# Patient Record
Sex: MALE | Race: WHITE | URBAN - METROPOLITAN AREA
[De-identification: names, ages, dates, MRNs, and addresses within clinical notes are randomized per-mention and may not be internally consistent; named-entity substitution may affect disease eponyms.]

---

## 2017-04-04 ENCOUNTER — OUTPATIENT (OUTPATIENT)
Dept: OUTPATIENT SERVICES | Facility: HOSPITAL | Age: 62
LOS: 1 days | Discharge: HOME | End: 2017-04-04

## 2017-06-27 DIAGNOSIS — J44.9 CHRONIC OBSTRUCTIVE PULMONARY DISEASE, UNSPECIFIED: ICD-10-CM

## 2017-06-27 DIAGNOSIS — I25.10 ATHEROSCLEROTIC HEART DISEASE OF NATIVE CORONARY ARTERY WITHOUT ANGINA PECTORIS: ICD-10-CM

## 2018-01-06 ENCOUNTER — OUTPATIENT (OUTPATIENT)
Dept: OUTPATIENT SERVICES | Facility: HOSPITAL | Age: 63
LOS: 1 days | Discharge: HOME | End: 2018-01-06

## 2018-01-06 DIAGNOSIS — I20.9 ANGINA PECTORIS, UNSPECIFIED: ICD-10-CM

## 2018-01-06 DIAGNOSIS — I71.4 ABDOMINAL AORTIC ANEURYSM, WITHOUT RUPTURE: ICD-10-CM

## 2018-01-06 DIAGNOSIS — I10 ESSENTIAL (PRIMARY) HYPERTENSION: ICD-10-CM

## 2018-01-06 DIAGNOSIS — J44.9 CHRONIC OBSTRUCTIVE PULMONARY DISEASE, UNSPECIFIED: ICD-10-CM

## 2019-01-15 ENCOUNTER — CHART COPY (OUTPATIENT)
Age: 64
End: 2019-01-15

## 2019-01-15 PROBLEM — Z00.00 ENCOUNTER FOR PREVENTIVE HEALTH EXAMINATION: Status: ACTIVE | Noted: 2019-01-15

## 2019-01-18 ENCOUNTER — FORM ENCOUNTER (OUTPATIENT)
Age: 64
End: 2019-01-18

## 2019-01-19 ENCOUNTER — OUTPATIENT (OUTPATIENT)
Dept: OUTPATIENT SERVICES | Facility: HOSPITAL | Age: 64
LOS: 1 days | Discharge: HOME | End: 2019-01-19

## 2019-01-19 DIAGNOSIS — I71.2 THORACIC AORTIC ANEURYSM, WITHOUT RUPTURE: ICD-10-CM

## 2019-02-05 ENCOUNTER — APPOINTMENT (OUTPATIENT)
Dept: CARDIOTHORACIC SURGERY | Facility: CLINIC | Age: 64
End: 2019-02-05

## 2019-02-05 VITALS
WEIGHT: 270 LBS | HEIGHT: 74 IN | TEMPERATURE: 98.4 F | RESPIRATION RATE: 13 BRPM | SYSTOLIC BLOOD PRESSURE: 137 MMHG | HEART RATE: 68 BPM | DIASTOLIC BLOOD PRESSURE: 85 MMHG | OXYGEN SATURATION: 96 % | BODY MASS INDEX: 34.65 KG/M2

## 2019-02-05 DIAGNOSIS — Z87.891 PERSONAL HISTORY OF NICOTINE DEPENDENCE: ICD-10-CM

## 2019-02-05 DIAGNOSIS — Z78.9 OTHER SPECIFIED HEALTH STATUS: ICD-10-CM

## 2019-02-05 DIAGNOSIS — Z82.49 FAMILY HISTORY OF ISCHEMIC HEART DISEASE AND OTHER DISEASES OF THE CIRCULATORY SYSTEM: ICD-10-CM

## 2019-02-05 NOTE — ASSESSMENT
[FreeTextEntry1] : Mr. PETTIT 63 year M  arrives today for his annual check for his ascending aortic aneurysm. He states that he is doing well, denies pain, or SOB. Denies CP or palpitations. States that he exercises by walking, and otherwise does not do any exertional exercising. He offers no complaints at this time. Recent CT results were reviewed with patient, and pt will f/u with our office in 1 yr with a repeat CT scan.

## 2019-02-05 NOTE — HISTORY OF PRESENT ILLNESS
[FreeTextEntry1] : Mr. PETTIT 62 y/o M  arrives today for his annual check for his ascending aortic aneurysm.\par \par His current healthcare team is as follows:\par Cardio: Magdi\par PMD: Min\par Pulm: Eldon

## 2019-02-05 NOTE — PHYSICAL EXAM
[] : no respiratory distress [Auscultation Breath Sounds / Voice Sounds] : lungs were clear to auscultation bilaterally [Heart Rate And Rhythm] : heart rate was normal and rhythm regular [Heart Sounds] : normal S1 and S2 [Heart Sounds Gallop] : no gallops [Murmurs] : no murmurs [Heart Sounds Pericardial Friction Rub] : no pericardial rub [Abnormal Walk] : normal gait [Nail Clubbing] : no clubbing  or cyanosis of the fingernails [Musculoskeletal - Swelling] : no joint swelling seen [Motor Tone] : muscle strength and tone were normal [Oriented To Time, Place, And Person] : oriented to person, place, and time [Impaired Insight] : insight and judgment were intact [Affect] : the affect was normal

## 2019-02-05 NOTE — DATA REVIEWED
[FreeTextEntry1] : EXAM: CT CHEST \par PROCEDURE DATE: 01/19/2019 \par INTERPRETATION: Reason for Exam: Enlarged aorta, CABG, ex-smoker. \par CT of the chest was performed from the thoracic inlet to the level of the \par adrenal glands without contrast injection. Coronal and sagittal images have \par been submitted. \par \par Comparison: 1/6/2018. \par \par Findings: \par \par Tubes/Lines: None. \par \par Lungs, Pleura, and Airways:No consolidation, effusion or pneumothorax. No \par suspicious nodules. Trachea and endobronchial tree within normal limits. \par Stable 1 cm left upper lobe air cyst (series 4, image 2-3). \par \par Mediastinum/Lymph Nodes:No enlarged mediastinal lymph nodes. Stable \par calcified left thyroid nodule. \par \par Heart/Great Vessels: The heart size is within normal limits without \par pericardial effusion. Patient is post CABG. The ascending thoracic aorta \par measures 4.6 x 4.5 cm at the level of the main pulmonary artery (series 4 \par image 109), stable. Aortic arch, normal 3.5 cm, stable. Descending thoracic \par aorta is stable at 3.3 cm \par \par Upper abdomen: Hepatic steatosis. \par \par Bones and soft tissues: Old left anterior rib fractures. Post-sternotomy. \par \par IMPRESSION: \par \par Stable aortic aneurysm, the ascending aorta measuring 4.6 x 4.5 cm.

## 2019-11-12 ENCOUNTER — APPOINTMENT (OUTPATIENT)
Dept: CARDIOLOGY | Facility: CLINIC | Age: 64
End: 2019-11-12
Payer: COMMERCIAL

## 2019-11-12 PROCEDURE — 93880 EXTRACRANIAL BILAT STUDY: CPT

## 2019-11-18 ENCOUNTER — APPOINTMENT (OUTPATIENT)
Dept: CARDIOLOGY | Facility: CLINIC | Age: 64
End: 2019-11-18
Payer: COMMERCIAL

## 2019-11-18 PROCEDURE — 93306 TTE W/DOPPLER COMPLETE: CPT

## 2019-11-26 ENCOUNTER — APPOINTMENT (OUTPATIENT)
Dept: CARDIOLOGY | Facility: CLINIC | Age: 64
End: 2019-11-26
Payer: COMMERCIAL

## 2019-11-26 PROCEDURE — 93000 ELECTROCARDIOGRAM COMPLETE: CPT

## 2019-11-26 PROCEDURE — 99214 OFFICE O/P EST MOD 30 MIN: CPT

## 2020-01-18 ENCOUNTER — OUTPATIENT (OUTPATIENT)
Dept: OUTPATIENT SERVICES | Facility: HOSPITAL | Age: 65
LOS: 1 days | Discharge: HOME | End: 2020-01-18
Payer: COMMERCIAL

## 2020-01-18 DIAGNOSIS — R17 UNSPECIFIED JAUNDICE: ICD-10-CM

## 2020-01-18 DIAGNOSIS — R63.4 ABNORMAL WEIGHT LOSS: ICD-10-CM

## 2020-01-18 DIAGNOSIS — R05 COUGH: ICD-10-CM

## 2020-01-18 PROCEDURE — 74177 CT ABD & PELVIS W/CONTRAST: CPT | Mod: 26

## 2020-01-18 PROCEDURE — 71260 CT THORAX DX C+: CPT | Mod: 26

## 2020-02-25 ENCOUNTER — APPOINTMENT (OUTPATIENT)
Dept: CARDIOTHORACIC SURGERY | Facility: CLINIC | Age: 65
End: 2020-02-25
Payer: COMMERCIAL

## 2020-02-25 VITALS
DIASTOLIC BLOOD PRESSURE: 94 MMHG | WEIGHT: 245 LBS | BODY MASS INDEX: 31.44 KG/M2 | TEMPERATURE: 98.3 F | HEIGHT: 74 IN | SYSTOLIC BLOOD PRESSURE: 139 MMHG | OXYGEN SATURATION: 94 % | RESPIRATION RATE: 12 BRPM | HEART RATE: 75 BPM

## 2020-02-25 PROCEDURE — 99212 OFFICE O/P EST SF 10 MIN: CPT

## 2020-02-25 RX ORDER — METFORMIN HYDROCHLORIDE 1000 MG/1
1000 TABLET, COATED ORAL
Refills: 0 | Status: DISCONTINUED | COMMUNITY
End: 2020-02-25

## 2020-02-25 RX ORDER — EZETIMIBE AND SIMVASTATIN 10; 10 MG/1; MG/1
10-10 TABLET ORAL
Refills: 0 | Status: DISCONTINUED | COMMUNITY
End: 2020-02-25

## 2020-02-25 NOTE — PHYSICAL EXAM
[Sclera] : the sclera and conjunctiva were normal [] : no respiratory distress [Respiration, Rhythm And Depth] : normal respiratory rhythm and effort [Exaggerated Use Of Accessory Muscles For Inspiration] : no accessory muscle use [Auscultation Breath Sounds / Voice Sounds] : lungs were clear to auscultation bilaterally [Heart Sounds] : normal S1 and S2 [Heart Rate And Rhythm] : heart rate was normal and rhythm regular [Abdomen Soft] : soft [Murmurs] : no murmurs [Skin Color & Pigmentation] : normal skin color and pigmentation [Oriented To Time, Place, And Person] : oriented to person, place, and time [Affect] : the affect was normal [Impaired Insight] : insight and judgment were intact [Mood] : the mood was normal

## 2020-03-06 NOTE — DATA REVIEWED
[FreeTextEntry1] : \par EXAM: CT ABDOMEN AND PELVIS OC IC \par EXAM: CT CHEST IC \par \par \par PROCEDURE DATE: 01/18/2020 \par \par \par \par \par INTERPRETATION: CLINICAL HISTORY/REASON FOR EXAM: Jaundice.. \par \par TECHNIQUE: Contiguous axial CT images were obtained from the thoracic inlet \par to the pubic symphysis following administration of Optiray 320 intravenous \par contrast. Oral contrast was administered. Reformatted images in the coronal \par and sagittal planes were acquired. Axial MIP images of the chest were \par obtained. \par \par This CT study was performed using radiation dose reduction software that \par reduces mA or kVp according to the patient's size to obtain diagnostic image \par quality with patient exposure as low as reasonably achievable. \par \par COMPARISON: CT chest 1/19/19. \par \par FINDINGS: \par \par CHEST: \par \par Tubes/Lines: None. \par \par Lungs, Pleura, and Airways:No consolidation, effusion or pneumothorax. No \par suspicious nodules. Trachea and endobronchial tree within normal limits. \par Stable 1 cm left upper lobe air cyst (series 4, image 2-3). \par \par Mediastinum/Lymph Nodes:No enlarged mediastinal lymph nodes. Stable \par calcified left thyroid nodule. \par \par Heart/Great Vessels: The heart size is within normal limits without \par pericardial effusion. Patient is post CABG. The ascending thoracic aorta \par measures 4.6 x 4.5 cm at the level of the main pulmonary artery, stable. \par Aortic arch, 3.5 cm, stable. Descending thoracic \par aorta is stable at 3.3 cm \par \par BONES/SOFT TISSUES: Old left anterior rib fractures. Gynecomastia. \par Poststernotomy. \par \par ABDOMEN/PELVIS: \par \par HEPATOBILIARY: Marked hepatomegaly. Hepatic steatosis. Trace perihepatic \par ascites. No focal lesions are seen. No biliary ductal dilatation is seen. \par \par SPLEEN: Mild splenomegaly. \par \par PANCREAS: 1.4 x 1.1 x 1.4 cm hypodensity within the pancreatic neck anterior \par to the SMV, indeterminate (series 4, image 278). No pancreatic duct \par dilatation. \par \par ADRENAL GLANDS: Bilateral adrenal gland thickening. \par \par KIDNEYS: Bilateral renal cysts and other indeterminate hypodensities. Right \par renal nonobstructing stones up to 6 mm. Left renal nonobstructing stones up \par to 7 mm. Symmetric pattern of renal enhancement. No hydronephrosis \par bilaterally. \par \par ABDOMINOPELVIC NODES: Prominent periportal lymph nodes. \par \par PELVIC ORGANS: Unremarkable. \par \par PERITONEUM/MESENTERY/BOWEL: No bowel obstruction. No pneumoperitoneum. \par Scattered colonic diverticula.. Normal appendix. \par \par BONES/SOFT TISSUES: Bilateral fat-containing hernias. Lower lumbar \par degenerative changes and probably degenerative listheses. \par \par OTHER: Vascular calcifications. \par \par \par IMPRESSION: \par \par Marked hepatomegaly and hepatic steatosis. No focal hepatic lesions are seen. \par \par 1.4 x 1.1 x 1.4 cm hypodensity within the pancreatic neck anterior to the \par SMV. It requires followup, however unlikely to be a cause of jaundice given \par the lack of ductal obstruction. \par \par Mild splenomegaly. \par \par Bilateral renal cysts and other indeterminate hypodensities, follow-up \par recommended. \par \par Other incidental findings as described. \par \par \par \par \par \par \par DOMINGA SANCHES M.D., ATTENDING RADIOLOGIST \par This document has been electronically signed. Jan 18 2020 5:42PM \par \par \par \par \par \par \par \par \par \par \par \par \par \par

## 2020-03-06 NOTE — ASSESSMENT
[FreeTextEntry1] : Mr. PETTIT 63 year M  arrives today for his annual check for his ascending aortic aneurysm. He states that he is doing well, denies pain, or SOB. Denies CP or palpitations. States that he exercises by walking, and otherwise does not do any exertional exercising. He offers no complaints at this time. Recent CT results were reviewed with patient, and pt will f/u with our office in 1 yr with a repeat CT scan.\par \par Patient is post CABG. The ascending thoracic aorta measures 4.6 x 4.5 cm at the level of the main pulmonary artery, stable. Aortic arch, 3.5 cm, stable. Descending thoracic aorta is stable at 3.3 cm .\par No change noted - will continue to monitor with yearly CT chest- non-contrast.  No surgical intervention indicated at this time.  All questions answered.  \par \par Maintain SBP < 160 and -60\par Life Style Modification to continue\par RTO in 1 year, or sooner if needed\par Medication Regimen as per Min Noble Bruno\par CT Chest non-con in Jan 2021\par \par

## 2020-03-06 NOTE — HISTORY OF PRESENT ILLNESS
[FreeTextEntry1] : Mr. PETTIT 64 y/o M  arrives today for his annual check for his ascending aortic aneurysm.\par \par  Patient is post CABG. The ascending thoracic aorta \par measures 4.6 x 4.5 cm at the level of the main pulmonary artery, stable. \par Aortic arch, 3.5 cm, stable. Descending thoracic \par aorta is stable at 3.3 cm \par \par \par Pt reports feeling, "good", lost weight was having some GI issues- weak and lost of appetite, resolved at this time. \par \par  \par His current healthcare team is as follows:\par Cardio: Magdi\par PMD: Fulop\par Pulm: Eldon

## 2020-03-06 NOTE — REVIEW OF SYSTEMS
[Negative] : Psychiatric [Fever] : no fever [Feeling Poorly] : not feeling poorly [Chest Pain] : no chest pain [Palpitations] : no palpitations [Shortness Of Breath] : no shortness of breath [Cough] : no cough [SOB on Exertion] : no shortness of breath during exertion [Confused] : no confusion [Dizziness] : no dizziness [Fainting] : no fainting

## 2020-08-19 ENCOUNTER — RECORD ABSTRACTING (OUTPATIENT)
Age: 65
End: 2020-08-19

## 2020-08-19 DIAGNOSIS — R55 SYNCOPE AND COLLAPSE: ICD-10-CM

## 2020-08-19 DIAGNOSIS — Z87.19 PERSONAL HISTORY OF OTHER DISEASES OF THE DIGESTIVE SYSTEM: ICD-10-CM

## 2020-08-19 DIAGNOSIS — Z86.79 PERSONAL HISTORY OF OTHER DISEASES OF THE CIRCULATORY SYSTEM: ICD-10-CM

## 2020-08-19 RX ORDER — ENALAPRIL MALEATE 2.5 MG/1
2.5 TABLET ORAL DAILY
Refills: 0 | Status: ACTIVE | COMMUNITY

## 2020-09-03 ENCOUNTER — APPOINTMENT (OUTPATIENT)
Dept: CARDIOLOGY | Facility: CLINIC | Age: 65
End: 2020-09-03
Payer: COMMERCIAL

## 2020-09-03 VITALS
HEIGHT: 74 IN | HEART RATE: 77 BPM | WEIGHT: 245 LBS | TEMPERATURE: 98.6 F | DIASTOLIC BLOOD PRESSURE: 90 MMHG | SYSTOLIC BLOOD PRESSURE: 130 MMHG | BODY MASS INDEX: 31.44 KG/M2

## 2020-09-03 PROCEDURE — 99214 OFFICE O/P EST MOD 30 MIN: CPT

## 2020-09-03 PROCEDURE — 93000 ELECTROCARDIOGRAM COMPLETE: CPT

## 2021-01-19 RX ORDER — METOPROLOL SUCCINATE 50 MG/1
50 TABLET, EXTENDED RELEASE ORAL DAILY
Qty: 90 | Refills: 3 | Status: ACTIVE | COMMUNITY
Start: 1900-01-01 | End: 1900-01-01

## 2021-01-27 ENCOUNTER — NON-APPOINTMENT (OUTPATIENT)
Age: 66
End: 2021-01-27

## 2021-02-11 ENCOUNTER — NON-APPOINTMENT (OUTPATIENT)
Age: 66
End: 2021-02-11

## 2021-02-16 ENCOUNTER — RESULT REVIEW (OUTPATIENT)
Age: 66
End: 2021-02-16

## 2021-02-16 ENCOUNTER — OUTPATIENT (OUTPATIENT)
Dept: OUTPATIENT SERVICES | Facility: HOSPITAL | Age: 66
LOS: 1 days | Discharge: HOME | End: 2021-02-16
Payer: COMMERCIAL

## 2021-02-16 DIAGNOSIS — I72.2 ANEURYSM OF RENAL ARTERY: ICD-10-CM

## 2021-02-16 PROCEDURE — 71250 CT THORAX DX C-: CPT | Mod: 26

## 2021-02-24 ENCOUNTER — APPOINTMENT (OUTPATIENT)
Dept: CARDIOTHORACIC SURGERY | Facility: CLINIC | Age: 66
End: 2021-02-24
Payer: COMMERCIAL

## 2021-02-24 ENCOUNTER — APPOINTMENT (OUTPATIENT)
Dept: CARDIOTHORACIC SURGERY | Facility: CLINIC | Age: 66
End: 2021-02-24

## 2021-02-24 PROCEDURE — 99212 OFFICE O/P EST SF 10 MIN: CPT | Mod: 95

## 2021-03-17 NOTE — HISTORY OF PRESENT ILLNESS
[FreeTextEntry1] : Mr. PETTIT 66 y/o M  arrives today for his annual check for his ascending aortic aneurysm.  Patient is post CABG.  His recent CT February 2021 showed Stable dilatation mid ascending aorta approximately 4.6 cm.  Visit today for results review.\par  \par His current healthcare team is as follows:\par Cardio: Magdi\par PMD: Min\par Pulm: Eldon

## 2021-03-17 NOTE — DATA REVIEWED
[FreeTextEntry1] :  EXAM:  CT CHEST\par \par \par PROCEDURE DATE:  02/16/2021\par \par \par \par \par INTERPRETATION:  Reason for Exam: Ascending aorta aneurysm follow-up\par CT of the chest was performed from the thoracic inlet to the level of the adrenal glands without contrast injection. Coronal and sagittal images have been submitted.\par \par Comparison: CT CHEST 1/18/2020, 1/19/2019\par \par Findings:\par \par Tubes/Lines: None\par \par Lungs, Pleura, and Airways: No endobronchial obstruction, mass or pleural effusions are noted. There is no evidence of bronchiectasis or honeycombing.\par New ill-defined area of nonspecific groundglass attenuation involving posterior right lower lobe is noted (series 601/121).\par \par Pulmonary nodules:\par No suspicious pulmonary nodules\par Punctate calcified granuloma left lower lobe (4/198)\par \par Mediastinum/Lymph Nodes:Stable calcification left thyroid lobe.\par \par Poststernotomy/CABG. No mediastinal adenopathy or mass.\par \par Heart/Great Vessels: No pericardial effusions. Stable dilatation mid ascending aorta approximately 4.6 cm (2/25). Stable tortuosity distal descending thoracic aorta Scattered coronary artery and aortic calcifications.\par \par Abdomen: Mild hepatic steatosis. Stable right renal cyst.\par \par Bones and soft tissues: No suspicious focal osseous lesions\par \par IMPRESSION:\par \par Stable dilatation mid ascending aorta approximately 4.6 cm (2/25).\par \par  Stable tortuosity distal descending thoracic aorta. Scattered coronary artery and aortic calcifications.\par \par New subtle ill-defined area of nonspecific groundglass attenuation, posterior right lower lobe is noted (series 601/121), likely representing area nonspecific airway inflammation..

## 2021-03-17 NOTE — ASSESSMENT
[FreeTextEntry1] : Mr. PETTIT 65 year M  arrives today for his annual check for his ascending aortic aneurysm. He states that he is doing well, denies pain, or SOB. Denies CP or palpitations. States that he exercises by walking, and otherwise does not do any exertional exercising.   His recent CT showed stable dilatation mid ascending aorta approximately 4.6 cm, stable tortuosity distal descending thoracic aorta. Scattered coronary artery and aortic calcifications.  Also a new subtle ill-defined area of nonspecific groundglass attenuation, posterior right lower lobe is noted (series 601/121), likely representing area nonspecific airway inflammation.  Televisit for review of CT Chest.\par \par Feels well since last seen\par No changes in medical history or medications\par \par Plan:\par CT Chest Non-Contrast 1 year\par FU after CT Chest\par F/U with Pulmonologist Dr. Colón, CT showed nonspecific GGO, will fax report\par F/U with Dr. Fairbanks for echocardiogram \par F/U with PMD for routine medical care\par \par -FMR

## 2021-04-20 ENCOUNTER — APPOINTMENT (OUTPATIENT)
Dept: CARDIOLOGY | Facility: CLINIC | Age: 66
End: 2021-04-20
Payer: COMMERCIAL

## 2021-04-20 PROCEDURE — 93306 TTE W/DOPPLER COMPLETE: CPT

## 2021-04-20 PROCEDURE — 99072 ADDL SUPL MATRL&STAF TM PHE: CPT

## 2021-05-04 ENCOUNTER — RESULT CHARGE (OUTPATIENT)
Age: 66
End: 2021-05-04

## 2021-05-04 ENCOUNTER — APPOINTMENT (OUTPATIENT)
Dept: CARDIOLOGY | Facility: CLINIC | Age: 66
End: 2021-05-04
Payer: COMMERCIAL

## 2021-05-04 VITALS
SYSTOLIC BLOOD PRESSURE: 152 MMHG | TEMPERATURE: 97.8 F | BODY MASS INDEX: 31.44 KG/M2 | DIASTOLIC BLOOD PRESSURE: 88 MMHG | HEIGHT: 74 IN | HEART RATE: 74 BPM | WEIGHT: 245 LBS

## 2021-05-04 PROCEDURE — 93000 ELECTROCARDIOGRAM COMPLETE: CPT

## 2021-05-04 PROCEDURE — 99072 ADDL SUPL MATRL&STAF TM PHE: CPT

## 2021-05-04 PROCEDURE — 99214 OFFICE O/P EST MOD 30 MIN: CPT

## 2021-05-04 RX ORDER — ASPIRIN 81 MG
81 TABLET, DELAYED RELEASE (ENTERIC COATED) ORAL DAILY
Refills: 0 | Status: COMPLETED | COMMUNITY
End: 2021-05-04

## 2021-05-04 RX ORDER — ROSUVASTATIN CALCIUM 20 MG/1
20 TABLET, FILM COATED ORAL
Refills: 0 | Status: COMPLETED | COMMUNITY
End: 2021-05-04

## 2021-11-16 ENCOUNTER — APPOINTMENT (OUTPATIENT)
Dept: CARDIOLOGY | Facility: CLINIC | Age: 66
End: 2021-11-16

## 2022-02-28 ENCOUNTER — OUTPATIENT (OUTPATIENT)
Dept: OUTPATIENT SERVICES | Facility: HOSPITAL | Age: 67
LOS: 1 days | Discharge: HOME | End: 2022-02-28
Payer: MEDICARE

## 2022-02-28 DIAGNOSIS — I71.2 THORACIC AORTIC ANEURYSM, WITHOUT RUPTURE: ICD-10-CM

## 2022-02-28 PROCEDURE — 71250 CT THORAX DX C-: CPT | Mod: 26,MH

## 2022-03-31 ENCOUNTER — APPOINTMENT (OUTPATIENT)
Dept: CARDIOTHORACIC SURGERY | Facility: CLINIC | Age: 67
End: 2022-03-31
Payer: MEDICARE

## 2022-03-31 PROCEDURE — 99442: CPT | Mod: 95

## 2022-03-31 NOTE — HISTORY OF PRESENT ILLNESS
[Home] : at home, [unfilled] , at the time of the visit. [Medical Office: ([facility])___] : at the medical office located in  [Verbal consent obtained from patient] : the patient, [unfilled]

## 2022-03-31 NOTE — ASSESSMENT
[FreeTextEntry1] : ROBYN PETTIT is a 66 year M  being seen in our office for a follow up for an ascending aortic aneurysm. PMH includes HTN,DM, CAD s/p CABG . Patient is known to our office. Patient refused video visit with their current images. They have no significant changes in their medical history and have been following with their healthcare team throughout the year as appropriate and was recommended to continue to do so. Given the absence of a history of sudden death, collagen vascular conditions, and presence of tricuspid aortic valve on echo, patient's threshold for an intervention for the ascending aortic aneurysm would be 5.5 cm or larger or rapid enlargement of the aneurysm (0.6 cm/6 months). Patient was educated on the presentation of the aneurysm, as well as signs and symptoms of rupture. Medical therapy for thoracic aneurysms was discussed with the patient (BP and HR control). Patient was instructed to maintain low salt diet, as well as maintain blood pressure parameters less than 120 mm hg systolic as well as a heart rate closer to 60 bpm.Patient plan will be to repeat a cat scan without Contrast in 1 year. PMD Dr. Copeland to get copy of results. Future visit via telephone only. Imaging reviewed from prior imaging scans and compared to current imaging. \par Time spent on call 8 minutes

## 2022-11-29 ENCOUNTER — APPOINTMENT (OUTPATIENT)
Dept: CARDIOLOGY | Facility: CLINIC | Age: 67
End: 2022-11-29

## 2022-11-29 VITALS — WEIGHT: 247 LBS | HEART RATE: 69 BPM | BODY MASS INDEX: 31.7 KG/M2 | HEIGHT: 74 IN

## 2022-11-29 VITALS — SYSTOLIC BLOOD PRESSURE: 120 MMHG | RESPIRATION RATE: 18 BRPM | DIASTOLIC BLOOD PRESSURE: 80 MMHG

## 2022-11-29 DIAGNOSIS — Z83.3 FAMILY HISTORY OF DIABETES MELLITUS: ICD-10-CM

## 2022-11-29 PROCEDURE — 99215 OFFICE O/P EST HI 40 MIN: CPT

## 2022-11-29 PROCEDURE — 93000 ELECTROCARDIOGRAM COMPLETE: CPT

## 2022-11-29 RX ORDER — METFORMIN HYDROCHLORIDE 500 MG/1
500 TABLET, COATED ORAL
Qty: 180 | Refills: 0 | Status: ACTIVE | COMMUNITY
Start: 2022-10-20

## 2023-06-01 ENCOUNTER — APPOINTMENT (OUTPATIENT)
Dept: CARDIOLOGY | Facility: CLINIC | Age: 68
End: 2023-06-01
Payer: MEDICARE

## 2023-06-01 VITALS — WEIGHT: 253 LBS | HEIGHT: 74 IN | BODY MASS INDEX: 32.47 KG/M2 | HEART RATE: 88 BPM

## 2023-06-01 VITALS — DIASTOLIC BLOOD PRESSURE: 80 MMHG | RESPIRATION RATE: 18 BRPM | SYSTOLIC BLOOD PRESSURE: 120 MMHG

## 2023-06-01 PROCEDURE — 99214 OFFICE O/P EST MOD 30 MIN: CPT

## 2023-06-01 PROCEDURE — 93000 ELECTROCARDIOGRAM COMPLETE: CPT

## 2023-06-14 ENCOUNTER — APPOINTMENT (OUTPATIENT)
Dept: CARDIOTHORACIC SURGERY | Facility: CLINIC | Age: 68
End: 2023-06-14
Payer: MEDICARE

## 2023-06-14 PROCEDURE — 99441: CPT | Mod: 95

## 2023-06-18 ENCOUNTER — EMERGENCY (EMERGENCY)
Facility: HOSPITAL | Age: 68
LOS: 0 days | Discharge: HOME | End: 2023-06-19
Attending: EMERGENCY MEDICINE
Payer: MEDICARE

## 2023-06-18 VITALS
TEMPERATURE: 99 F | OXYGEN SATURATION: 100 % | RESPIRATION RATE: 18 BRPM | DIASTOLIC BLOOD PRESSURE: 88 MMHG | HEART RATE: 107 BPM | SYSTOLIC BLOOD PRESSURE: 139 MMHG

## 2023-06-18 DIAGNOSIS — S09.90XA UNSPECIFIED INJURY OF HEAD, INITIAL ENCOUNTER: ICD-10-CM

## 2023-06-18 DIAGNOSIS — S02.832A FRACTURE OF MEDIAL ORBITAL WALL, LEFT SIDE, INITIAL ENCOUNTER FOR CLOSED FRACTURE: ICD-10-CM

## 2023-06-18 DIAGNOSIS — D64.9 ANEMIA, UNSPECIFIED: ICD-10-CM

## 2023-06-18 DIAGNOSIS — E87.20 ACIDOSIS, UNSPECIFIED: ICD-10-CM

## 2023-06-18 DIAGNOSIS — R74.01 ELEVATION OF LEVELS OF LIVER TRANSAMINASE LEVELS: ICD-10-CM

## 2023-06-18 DIAGNOSIS — J44.9 CHRONIC OBSTRUCTIVE PULMONARY DISEASE, UNSPECIFIED: ICD-10-CM

## 2023-06-18 DIAGNOSIS — Z88.7 ALLERGY STATUS TO SERUM AND VACCINE: ICD-10-CM

## 2023-06-18 DIAGNOSIS — E11.9 TYPE 2 DIABETES MELLITUS WITHOUT COMPLICATIONS: ICD-10-CM

## 2023-06-18 DIAGNOSIS — I25.10 ATHEROSCLEROTIC HEART DISEASE OF NATIVE CORONARY ARTERY WITHOUT ANGINA PECTORIS: ICD-10-CM
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
## 2023-06-18 DIAGNOSIS — Z95.1 PRESENCE OF AORTOCORONARY BYPASS GRAFT: ICD-10-CM

## 2023-06-18 DIAGNOSIS — E80.7 DISORDER OF BILIRUBIN METABOLISM, UNSPECIFIED: ICD-10-CM
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
## 2023-06-18 DIAGNOSIS — S02.2XXA FRACTURE OF NASAL BONES, INITIAL ENCOUNTER FOR CLOSED FRACTURE: ICD-10-CM

## 2023-06-18 DIAGNOSIS — S01.511A LACERATION WITHOUT FOREIGN BODY OF LIP, INITIAL ENCOUNTER: ICD-10-CM

## 2023-06-18 DIAGNOSIS — Z88.0 ALLERGY STATUS TO PENICILLIN: ICD-10-CM
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
## 2023-06-18 LAB
ALBUMIN SERPL ELPH-MCNC: 3.8 G/DL — SIGNIFICANT CHANGE UP (ref 3.5–5.2)
ALP SERPL-CCNC: 93 U/L — SIGNIFICANT CHANGE UP (ref 30–115)
ALT FLD-CCNC: 46 U/L — HIGH (ref 0–41)
ANION GAP SERPL CALC-SCNC: 15 MMOL/L — HIGH (ref 7–14)
APTT BLD: 37.6 SEC — SIGNIFICANT CHANGE UP (ref 27–39.2)
AST SERPL-CCNC: 142 U/L — HIGH (ref 0–41)
BASOPHILS # BLD AUTO: 0.11 K/UL — SIGNIFICANT CHANGE UP (ref 0–0.2)
BASOPHILS NFR BLD AUTO: 0.9 % — SIGNIFICANT CHANGE UP (ref 0–1)
BILIRUB SERPL-MCNC: 2.4 MG/DL — HIGH (ref 0.2–1.2)
BUN SERPL-MCNC: 5 MG/DL — LOW (ref 10–20)
CALCIUM SERPL-MCNC: 9.3 MG/DL — SIGNIFICANT CHANGE UP (ref 8.4–10.4)
CHLORIDE SERPL-SCNC: 99 MMOL/L — SIGNIFICANT CHANGE UP (ref 98–110)
CO2 SERPL-SCNC: 23 MMOL/L — SIGNIFICANT CHANGE UP (ref 17–32)
CREAT SERPL-MCNC: 0.6 MG/DL — LOW (ref 0.7–1.5)
EGFR: 105 ML/MIN/1.73M2 — SIGNIFICANT CHANGE UP
EOSINOPHIL # BLD AUTO: 0.21 K/UL — SIGNIFICANT CHANGE UP (ref 0–0.7)
EOSINOPHIL NFR BLD AUTO: 1.7 % — SIGNIFICANT CHANGE UP (ref 0–8)
ETHANOL SERPL-MCNC: 281 MG/DL — HIGH
GLUCOSE SERPL-MCNC: 136 MG/DL — HIGH (ref 70–99)
HCT VFR BLD CALC: 35 % — LOW (ref 42–52)
HGB BLD-MCNC: 12.3 G/DL — LOW (ref 14–18)
IMM GRANULOCYTES NFR BLD AUTO: 0.9 % — HIGH (ref 0.1–0.3)
INR BLD: 1.56 RATIO — HIGH (ref 0.65–1.3)
LACTATE SERPL-SCNC: 3.8 MMOL/L — HIGH (ref 0.7–2)
LIDOCAIN IGE QN: 74 U/L — HIGH (ref 7–60)
LYMPHOCYTES # BLD AUTO: 3.82 K/UL — HIGH (ref 1.2–3.4)
LYMPHOCYTES # BLD AUTO: 31.1 % — SIGNIFICANT CHANGE UP (ref 20.5–51.1)
MCHC RBC-ENTMCNC: 34.8 PG — HIGH (ref 27–31)
MCHC RBC-ENTMCNC: 35.1 G/DL — SIGNIFICANT CHANGE UP (ref 32–37)
MCV RBC AUTO: 99.2 FL — HIGH (ref 80–94)
MONOCYTES # BLD AUTO: 1.04 K/UL — HIGH (ref 0.1–0.6)
MONOCYTES NFR BLD AUTO: 8.5 % — SIGNIFICANT CHANGE UP (ref 1.7–9.3)
NEUTROPHILS # BLD AUTO: 7 K/UL — HIGH (ref 1.4–6.5)
NEUTROPHILS NFR BLD AUTO: 56.9 % — SIGNIFICANT CHANGE UP (ref 42.2–75.2)
NRBC # BLD: 0 /100 WBCS — SIGNIFICANT CHANGE UP (ref 0–0)
PLATELET # BLD AUTO: 174 K/UL — SIGNIFICANT CHANGE UP (ref 130–400)
PMV BLD: 11.7 FL — HIGH (ref 7.4–10.4)
POTASSIUM SERPL-MCNC: 4.6 MMOL/L — SIGNIFICANT CHANGE UP (ref 3.5–5)
POTASSIUM SERPL-SCNC: 4.6 MMOL/L — SIGNIFICANT CHANGE UP (ref 3.5–5)
PROT SERPL-MCNC: 7.7 G/DL — SIGNIFICANT CHANGE UP (ref 6–8)
PROTHROM AB SERPL-ACNC: 18 SEC — HIGH (ref 9.95–12.87)
RBC # BLD: 3.53 M/UL — LOW (ref 4.7–6.1)
RBC # FLD: 15.9 % — HIGH (ref 11.5–14.5)
SODIUM SERPL-SCNC: 137 MMOL/L — SIGNIFICANT CHANGE UP (ref 135–146)
WBC # BLD: 12.29 K/UL — HIGH (ref 4.8–10.8)
WBC # FLD AUTO: 12.29 K/UL — HIGH (ref 4.8–10.8)

## 2023-06-18 PROCEDURE — 96374 THER/PROPH/DIAG INJ IV PUSH: CPT | Mod: XU

## 2023-06-18 PROCEDURE — 80307 DRUG TEST PRSMV CHEM ANLYZR: CPT

## 2023-06-18 PROCEDURE — 72170 X-RAY EXAM OF PELVIS: CPT

## 2023-06-18 PROCEDURE — 70450 CT HEAD/BRAIN W/O DYE: CPT | Mod: MA

## 2023-06-18 PROCEDURE — 72125 CT NECK SPINE W/O DYE: CPT | Mod: 26,MA

## 2023-06-18 PROCEDURE — 12013 RPR F/E/E/N/L/M 2.6-5.0 CM: CPT

## 2023-06-18 PROCEDURE — 12053 INTMD RPR FACE/MM 5.1-7.5 CM: CPT

## 2023-06-18 PROCEDURE — 82962 GLUCOSE BLOOD TEST: CPT

## 2023-06-18 PROCEDURE — 85730 THROMBOPLASTIN TIME PARTIAL: CPT

## 2023-06-18 PROCEDURE — 36415 COLL VENOUS BLD VENIPUNCTURE: CPT

## 2023-06-18 PROCEDURE — 72125 CT NECK SPINE W/O DYE: CPT | Mod: MA

## 2023-06-18 PROCEDURE — 83605 ASSAY OF LACTIC ACID: CPT

## 2023-06-18 PROCEDURE — 71045 X-RAY EXAM CHEST 1 VIEW: CPT | Mod: 26

## 2023-06-18 PROCEDURE — 83690 ASSAY OF LIPASE: CPT

## 2023-06-18 PROCEDURE — 80053 COMPREHEN METABOLIC PANEL: CPT

## 2023-06-18 PROCEDURE — 72170 X-RAY EXAM OF PELVIS: CPT | Mod: 26

## 2023-06-18 PROCEDURE — 70450 CT HEAD/BRAIN W/O DYE: CPT | Mod: 26,MA

## 2023-06-18 PROCEDURE — 70486 CT MAXILLOFACIAL W/O DYE: CPT | Mod: 26,MA

## 2023-06-18 PROCEDURE — 99285 EMERGENCY DEPT VISIT HI MDM: CPT | Mod: 25

## 2023-06-18 PROCEDURE — 70486 CT MAXILLOFACIAL W/O DYE: CPT | Mod: MA

## 2023-06-18 PROCEDURE — 85610 PROTHROMBIN TIME: CPT

## 2023-06-18 PROCEDURE — 85025 COMPLETE CBC W/AUTO DIFF WBC: CPT

## 2023-06-18 PROCEDURE — 96375 TX/PRO/DX INJ NEW DRUG ADDON: CPT | Mod: XU

## 2023-06-18 PROCEDURE — 71045 X-RAY EXAM CHEST 1 VIEW: CPT

## 2023-06-18 PROCEDURE — 99284 EMERGENCY DEPT VISIT MOD MDM: CPT | Mod: 25

## 2023-06-18 RX ORDER — CEFAZOLIN SODIUM 1 G
2000 VIAL (EA) INJECTION ONCE
Refills: 0 | Status: COMPLETED | OUTPATIENT
Start: 2023-06-18 | End: 2023-06-18

## 2023-06-18 RX ORDER — SODIUM CHLORIDE 9 MG/ML
1000 INJECTION, SOLUTION INTRAVENOUS ONCE
Refills: 0 | Status: COMPLETED | OUTPATIENT
Start: 2023-06-18 | End: 2023-06-18

## 2023-06-18 RX ORDER — OFLOXACIN 0.3 %
1 DROPS OPHTHALMIC (EYE)
Qty: 1 | Refills: 0
Start: 2023-06-18 | End: 2023-06-24

## 2023-06-18 RX ORDER — PHENYLEPHRINE HCL 0.25 %
1 AEROSOL, SPRAY WITH PUMP (ML) NASAL ONCE
Refills: 0 | Status: COMPLETED | OUTPATIENT
Start: 2023-06-18 | End: 2023-06-18

## 2023-06-18 RX ORDER — OXYMETAZOLINE HYDROCHLORIDE 0.5 MG/ML
2 SPRAY NASAL ONCE
Refills: 0 | Status: DISCONTINUED | OUTPATIENT
Start: 2023-06-18 | End: 2023-06-18

## 2023-06-18 RX ORDER — THIAMINE MONONITRATE (VIT B1) 100 MG
100 TABLET ORAL ONCE
Refills: 0 | Status: COMPLETED | OUTPATIENT
Start: 2023-06-18 | End: 2023-06-18

## 2023-06-18 RX ADMIN — Medication 100 MILLIGRAM(S): at 21:33

## 2023-06-18 RX ADMIN — Medication 100 MILLIGRAM(S): at 20:43

## 2023-06-18 RX ADMIN — SODIUM CHLORIDE 1000 MILLILITER(S): 9 INJECTION, SOLUTION INTRAVENOUS at 20:43

## 2023-06-18 RX ADMIN — Medication 1 SPRAY(S): at 23:29

## 2023-06-18 NOTE — ED ADULT NURSE NOTE - NSFALLRISKINTERV_ED_ALL_ED
Assistance OOB with selected safe patient handling equipment if applicable/Assistance with ambulation/Communicate fall risk and risk factors to all staff, patient, and family/Encourage patient to sit up slowly, dangle for a short time, stand at bedside before walking/Monitor gait and stability/Monitor for mental status changes and reorient to person, place, and time, as needed/Move patient closer to nursing station/within visual sight of ED staff/Orthostatic vital signs/Provide visual cue: yellow wristband, yellow gown, etc/Reinforce activity limits and safety measures with patient and family/Review medications for side effects contributing to fall risk/Toileting schedule using arm’s reach rule for commode and bathroom/Use of alarms - bed, stretcher, chair and/or video monitoring/Supervised room monitoring/Bed in lowest position, wheels locked, appropriate side rails in place/Call bell, personal items and telephone in reach/Instruct patient to call for assistance before getting out of bed/chair/stretcher/Non-slip footwear applied when patient is off stretcher/[place] to call system/Physically safe environment - no spills, clutter or unnecessary equipment/Purposeful Proactive Rounding/Room/bathroom lighting operational, light cord in reach

## 2023-06-18 NOTE — ED ADULT NURSE NOTE - OBJECTIVE STATEMENT
BIBA from home, pt is intoxicated, tripped while walking in his dining room, has laceration to the left side of his face. denies LOC, denies A/C use. Pt's last drink was couple glasses of wine.

## 2023-06-18 NOTE — ED PROVIDER NOTE - OBJECTIVE STATEMENT
PT is a 68Mwiht PMH of DM, COPD, CABG 6 years ago not on home O2 p/w fall. PT endorsing mechanical fall at home just PTA. +HT, no LOC, no AC use. +alcohol use this AM. Per EMS, vitals wnl on scene. PT denies anypain in the head or neck,vision change, pain elsewhere. PT otherwise well.

## 2023-06-18 NOTE — ED PROVIDER NOTE - CARE PROVIDER_API CALL
Min Brooklyn  Internal Medicine  [address] [place] [address]  Phone: [phone]  Fax: [phone]  Established Patient  Follow Up Time: 1-3 Days   Mendel Copeland  Internal Medicine  [address] [place] [address]  Phone: [phone]  Fax: [phone]  Established Patient  Follow Up Time: 1-3 Days    Santos Ly  Ophthalmology  [address] [place] [address]  Phone: [phone]  Fax: [phone]  Follow Up Time: 4-6 Days

## 2023-06-18 NOTE — ED PROVIDER NOTE - PHYSICAL EXAMINATION
CONSTITUTIONAL: WDWN. NAD. Speaking in full sentences, moving all extremities.  SKIN: =2cm laceration below LT eye with active bleed, abrasion to anterior nose  HEAD: NCAT  EYES: PERRLA, chemosis of LT eye,   ENT: TMs WNL. No hemotympanum noted.  NECK: No posterior midline cervical tenderness  CARD: +S1, S2 no murmurs, gallops, or rubs. Regular rate and rhythm. Radial, DP, PT 2+/4 bilaterally  RESP: LCTAB. No wheezes, rales or rhonchi.  ABD: Abdomen soft, nontender, nondistended.  NEURO: Alert, oriented, grossly unremarkable. Strength 5/5 in bilateral UE and LEs. CN 2-12 grossly intact. Follows commands.  MSK: No TTP in UE and LEs. No chest wall tenderness or crepitus  BACK: No posterior midline tenderness  PSYCH: Cooperative, appropriate. CONSTITUTIONAL: WDWN. NAD. Speaking in full sentences, moving all extremities.  SKIN: =2cm laceration below LT eye with active bleed, 0.5cm laceration to anterior nose, 1cm laceration to interior of inferior lip, 0.2 laceration to chin not through and through  HEAD: NCAT  EYES: PERRLA, chemosis of LT eye, EOMI intact, no proptosis, vision intact  ENT: TMs WNL. No hemotympanum noted. dried blood in b/l nares without septal hematoma  NECK: No posterior midline cervical tenderness  CARD: +S1, S2 no murmurs, gallops, or rubs. Regular rate and rhythm. Radial, DP, PT 2+/4 bilaterally  RESP: LCTAB. No wheezes, rales or rhonchi.  ABD: Abdomen soft, nontender, nondistended.  NEURO: Alert, oriented, grossly unremarkable. Strength 5/5 in bilateral UE and LEs. CN 2-12 grossly intact. Follows commands.  MSK: No TTP in UE and LEs. No chest wall tenderness or crepitus  BACK: No posterior midline tenderness  PSYCH: Cooperative, appropriate.

## 2023-06-18 NOTE — ED ADULT TRIAGE NOTE - HEART RATE (BEATS/MIN)
Patient off unit in stable condition via wheelchair with volunteers for discharge home per  MD.  Patient is to follow up in 6 weeks and is aware. Prescriptions given to patient. Patient denies H/A, dizziness, nausea and or vomiting or pain at this time. Infant in car seat with mom. 89

## 2023-06-18 NOTE — ED PROVIDER NOTE - CARE PLAN
1 Principal Discharge DX:	Fall  Secondary Diagnosis:	Laceration of face   Principal Discharge DX:	Fall  Secondary Diagnosis:	Laceration of face  Secondary Diagnosis:	Nasal bone fracture  Secondary Diagnosis:	Medial orbital wall fracture

## 2023-06-18 NOTE — ED ADULT NURSE NOTE - NS ED NURSE DISCH DISPOSITION
BENNIE--obtained from Se Damion Marrero. Placed BENNIE in black bin located in precepting room.   Discharged

## 2023-06-18 NOTE — ED ADULT TRIAGE NOTE - CHIEF COMPLAINT QUOTE
BIBA from home, pt is intoxicated, tripped while walking in his dining room, has laceration to the left side of his face. denies LOC, denies A/C use

## 2023-06-18 NOTE — ED PROVIDER NOTE - CLINICAL SUMMARY MEDICAL DECISION MAKING FREE TEXT BOX
68yM DM COPD CAD s/p CABG not on home O2 p/w mechanical fall, +EtOH.  No LOC or a/c use but pt w/ multiple bloody facial lacs, including one L infraorbital lac still oozing, possibly blood vessel lacerated.  Bleeding stopped w/ cautery and wound repaired.  Imaging w/ medial orbital wall fx, sig periorbital/eyelid ecchymosis and nasal bone/midface lacerations.  Ophtho consulted given concern for EOM entrapment, but no sign of clinical entrapment after review.  Wounds repaired at bedside and IV abx given.  Nasal precautions reviewed and pt to f/u tomorrow with ophtho clinic for further evaluation and care. 68yM DM COPD CAD s/p CABG not on home O2 p/w mechanical fall, +EtOH.  No LOC or a/c use but pt w/ multiple bloody facial lacs, including one L infraorbital lac still oozing, possibly blood vessel lacerated.  Bleeding stopped w/ cautery and wound repaired.  Imaging w/ medial orbital wall fx, sig periorbital/eyelid ecchymosis and nasal bone/midface lacerations.  Labs w/ mild anemia, moderate lactic acidosis and mild transaminitis/hyperbilirubinemia/elevated lipase, all likely from chronic alcohol abuse.  Ophtho consulted given concern for EOM entrapment, but no sign of clinical entrapment after review.  Wounds repaired at bedside and IV abx given.  Nasal precautions reviewed and pt to f/u tomorrow with ophtho clinic for further evaluation and care.

## 2023-06-18 NOTE — ED PROVIDER NOTE - PROVIDER TOKENS
PROVIDER:[TOKEN:[[id number]],FOLLOWUP:[1-3 Days],ESTABLISHEDPATIENT:[T]] PROVIDER:[TOKEN:[[id number]],FOLLOWUP:[1-3 Days],ESTABLISHEDPATIENT:[T]],PROVIDER:[TOKEN:[[id number]],FOLLOWUP:[4-6 Days]]

## 2023-06-18 NOTE — ED PROVIDER NOTE - PROGRESS NOTE DETAILS
EK - CT w/ L medial orbital wall/lamina papyracea fx w/ fat herniation into L ethmoidal sinus, L proptosis, L intraorbital air and thickened L medial rectus muscle concerning for ?injury.  Also b/l nasal and vomer bone fx.  Ophtho consulted, awaiting recs. AY: Consulted Ophthalmologist Dr. Hutchinson  - stated will evaluate based on images and video of patient's eye exam Spoke to Ortho, stated no need for admission, advised p.o. antibiotics, p.o. steroids, topical steroids, ice packs on and off eye, no nose blowing, follow-up in [facility] in 1 week -CD AY: Patient was instructed on medication administration at home. The patient was given detailed return precautions and advised to return to the emergency department if any new symptoms developed, symptoms worsened or for any concerns. The patient was offered the opportunity to ask questions and verbalized that they understand the diagnosis and discharge instructions.

## 2023-06-18 NOTE — ED PROVIDER NOTE - ATTENDING CONTRIBUTION TO CARE
68yM DM COPD CAD s/p CABG p/w head injury - pt admits to drinking alcohol this morning and then fell in his kitchen this evening, apparently tripping on the floor.  Pt arrived swathed in blood all over his face despite gauze dressing - once cleansed, one visible deep wound below his L eye and lac to inner mouth.  Not on a/c.  Pt denies neck pain.

## 2023-06-18 NOTE — ED PROVIDER NOTE - NSFOLLOWUPCLINICS_GEN_ALL_ED_FT
[facility]  Ophthalmolgy  [address], [address]  [place] [address]  Phone: [phone]  Fax:   Follow Up Time: 1-3 Days

## 2023-06-18 NOTE — ED PROVIDER NOTE - PATIENT PORTAL LINK FT
You can access the FollowMyHealth Patient Portal offered by [facility] by registering at the following website: [email or web address]. By joining [employer]’s FollowMyHealth portal, you will also be able to view your health information using other applications (apps) compatible with our system.

## 2023-06-18 NOTE — ED PROVIDER NOTE - NSFOLLOWUPINSTRUCTIONS_ED_ALL_ED_FT
Fall Prevention in the Home, Adult  Falls can cause injuries and can affect people from all age groups. There are many simple things that you can do to make your home safe and to help prevent falls. Ask for help when making these changes, if needed.    What actions can I take to prevent falls?  General instructions     Use good lighting in all rooms. Replace any light bulbs that burn out.  Turn on lights if it is dark. Use night-lights.  Place frequently used items in easy-to-reach places. Lower the shelves around your home if necessary.  Set up furniture so that there are clear paths around it. Avoid moving your furniture around.  Remove throw rugs and other tripping hazards from the floor.  Avoid walking on wet floors.  Fix any uneven floor surfaces.  Add color or contrast paint or tape to grab bars and handrails in your home. Place contrasting color strips on the first and last steps of stairways.  When you use a stepladder, make sure that it is completely opened and that the sides are firmly locked. Have someone hold the ladder while you are using it. Do not climb a closed stepladder.  Be aware of any and all pets.  What can I do in the bathroom?     Keep the floor dry. Immediately clean up any water that spills onto the floor.  Remove soap buildup in the tub or shower on a regular basis.  Use non-skid mats or decals on the floor of the tub or shower.  Attach bath mats securely with double-sided, non-slip rug tape.  If you need to sit down while you are in the shower, use a plastic, non-slip stool.  Image ImageInstall grab bars by the toilet and in the tub and shower. Do not use towel bars as grab bars.  What can I do in the bedroom?     Make sure that a bedside light is easy to reach.  Do not use oversized bedding that drapes onto the floor.  Have a firm chair that has side arms to use for getting dressed.  What can I do in the kitchen?     Clean up any spills right away.  If you need to reach for something above you, use a sturdy step stool that has a grab bar.  Keep electrical cables out of the way.  Do not use floor polish or wax that makes floors slippery. If you must use wax, make sure that it is non-skid floor wax.  What can I do in the stairways?     Do not leave any items on the stairs.  Make sure that you have a light switch at the top of the stairs and the bottom of the stairs. Have them installed if you do not have them.  Make sure that there are handrails on both sides of the stairs. Fix handrails that are broken or loose. Make sure that handrails are as long as the stairways.  Install non-slip stair treads on all stairs in your home.  Avoid having throw rugs at the top or bottom of stairways, or secure the rugs with carpet tape to prevent them from moving.  Choose a carpet design that does not hide the edge of steps on the stairway.  Check any carpeting to make sure that it is firmly attached to the stairs. Fix any carpet that is loose or worn.  What can I do on the outside of my home?     Use bright outdoor lighting.  Regularly repair the edges of walkways and driveways and fix any cracks.  Remove high doorway thresholds.  Trim any shrubbery on the main path into your home.  Regularly check that handrails are securely fastened and in good repair. Both sides of any steps should have handrails.  Install guardrails along the edges of any raised decks or porches.  Clear walkways of debris and clutter, including tools and rocks.  Have leaves, snow, and ice cleared regularly.  Use sand or salt on walkways during winter months.  In the garage, clean up any spills right away, including grease or oil spills.  What other actions can I take?     Wear closed-toe shoes that fit well and support your feet. Wear shoes that have rubber soles or low heels.  Use mobility aids as needed, such as canes, walkers, scooters, and crutches.  Review your medicines with your health care provider. Some medicines can cause dizziness or changes in blood pressure, which increase your risk of falling.  Talk with your health care provider about other ways that you can decrease your risk of falls. This may include working with a physical therapist or  to improve your strength, balance, and endurance.    Where to find more information  Centers for Disease Control and Prevention, [place]SE: https://www.cdc.gov  National [place] on Aging: https://kk6ibbo.benja.nih.gov  Contact a health care provider if:  You are afraid of falling at home.  You feel weak, drowsy, or dizzy at home.  You fall at home.  Summary  There are many simple things that you can do to make your home safe and to help prevent falls.  Ways to make your home safe include removing tripping hazards and installing grab bars in the bathroom.  Ask for help when making these changes in your home.  This information is not intended to replace advice given to you by your health care provider. Make sure you discuss any questions you have with your health care provider.        Laceration    A laceration is a cut that goes through all of the layers of the skin and into the tissue that is right under the skin. Some lacerations heal on their own. Others need to be closed with stitches (sutures), staples, skin adhesive strips, or skin glue. Proper laceration care minimizes the risk of infection and helps the laceration to heal better.     SEEK IMMEDIATE MEDICAL CARE IF YOU HAVE THE FOLLOWING SYMPTOMS: swelling around the wound, worsening pain, drainage from the wound, red streaking going away from your wound, inability to move finger or toe near the laceration, or discoloration of skin near the laceration. Medications were sent to your pharmacy, take them as prescribed.   Follow up at [facility] within the next 5 days.  Do not blow your nose until evaluated by ENT. Place Ice packs on your left eye for 10-15 minutes at a time several times per day to help decrease swelling and pain.    Fall Prevention in the Home, Adult  Falls can cause injuries and can affect people from all age groups. There are many simple things that you can do to make your home safe and to help prevent falls. Ask for help when making these changes, if needed.    What actions can I take to prevent falls?  General instructions     Use good lighting in all rooms. Replace any light bulbs that burn out.  Turn on lights if it is dark. Use night-lights.  Place frequently used items in easy-to-reach places. Lower the shelves around your home if necessary.  Set up furniture so that there are clear paths around it. Avoid moving your furniture around.  Remove throw rugs and other tripping hazards from the floor.  Avoid walking on wet floors.  Fix any uneven floor surfaces.  Add color or contrast paint or tape to grab bars and handrails in your home. Place contrasting color strips on the first and last steps of stairways.  When you use a stepladder, make sure that it is completely opened and that the sides are firmly locked. Have someone hold the ladder while you are using it. Do not climb a closed stepladder.  Be aware of any and all pets.  What can I do in the bathroom?     Keep the floor dry. Immediately clean up any water that spills onto the floor.  Remove soap buildup in the tub or shower on a regular basis.  Use non-skid mats or decals on the floor of the tub or shower.  Attach bath mats securely with double-sided, non-slip rug tape.  If you need to sit down while you are in the shower, use a plastic, non-slip stool.  Image ImageInstall grab bars by the toilet and in the tub and shower. Do not use towel bars as grab bars.  What can I do in the bedroom?     Make sure that a bedside light is easy to reach.  Do not use oversized bedding that drapes onto the floor.  Have a firm chair that has side arms to use for getting dressed.  What can I do in the kitchen?     Clean up any spills right away.  If you need to reach for something above you, use a sturdy step stool that has a grab bar.  Keep electrical cables out of the way.  Do not use floor polish or wax that makes floors slippery. If you must use wax, make sure that it is non-skid floor wax.  What can I do in the stairways?     Do not leave any items on the stairs.  Make sure that you have a light switch at the top of the stairs and the bottom of the stairs. Have them installed if you do not have them.  Make sure that there are handrails on both sides of the stairs. Fix handrails that are broken or loose. Make sure that handrails are as long as the stairways.  Install non-slip stair treads on all stairs in your home.  Avoid having throw rugs at the top or bottom of stairways, or secure the rugs with carpet tape to prevent them from moving.  Choose a carpet design that does not hide the edge of steps on the stairway.  Check any carpeting to make sure that it is firmly attached to the stairs. Fix any carpet that is loose or worn.  What can I do on the outside of my home?     Use bright outdoor lighting.  Regularly repair the edges of walkways and driveways and fix any cracks.  Remove high doorway thresholds.  Trim any shrubbery on the main path into your home.  Regularly check that handrails are securely fastened and in good repair. Both sides of any steps should have handrails.  Install guardrails along the edges of any raised decks or porches.  Clear walkways of debris and clutter, including tools and rocks.  Have leaves, snow, and ice cleared regularly.  Use sand or salt on walkways during winter months.  In the garage, clean up any spills right away, including grease or oil spills.  What other actions can I take?     Wear closed-toe shoes that fit well and support your feet. Wear shoes that have rubber soles or low heels.  Use mobility aids as needed, such as canes, walkers, scooters, and crutches.  Review your medicines with your health care provider. Some medicines can cause dizziness or changes in blood pressure, which increase your risk of falling.  Talk with your health care provider about other ways that you can decrease your risk of falls. This may include working with a physical therapist or  to improve your strength, balance, and endurance.    Where to find more information  Centers for Disease Control and PreventionJOSE: https://www.cdc.gov  National [place] on Aging: https://vz8llno.benja.nih.gov  Contact a health care provider if:  You are afraid of falling at home.  You feel weak, drowsy, or dizzy at home.  You fall at home.  Summary  There are many simple things that you can do to make your home safe and to help prevent falls.  Ways to make your home safe include removing tripping hazards and installing grab bars in the bathroom.  Ask for help when making these changes in your home.  This information is not intended to replace advice given to you by your health care provider. Make sure you discuss any questions you have with your health care provider.        Laceration    A laceration is a cut that goes through all of the layers of the skin and into the tissue that is right under the skin. Some lacerations heal on their own. Others need to be closed with stitches (sutures), staples, skin adhesive strips, or skin glue. Proper laceration care minimizes the risk of infection and helps the laceration to heal better.     SEEK IMMEDIATE MEDICAL CARE IF YOU HAVE THE FOLLOWING SYMPTOMS: swelling around the wound, worsening pain, drainage from the wound, red streaking going away from your wound, inability to move finger or toe near the laceration, or discoloration of skin near the laceration.

## 2023-06-19 VITALS
DIASTOLIC BLOOD PRESSURE: 87 MMHG | OXYGEN SATURATION: 98 % | SYSTOLIC BLOOD PRESSURE: 128 MMHG | HEART RATE: 97 BPM | RESPIRATION RATE: 17 BRPM

## 2023-06-19 NOTE — ED POST DISCHARGE NOTE - RESULT SUMMARY
CT MF- FB'S NOTE TO NOSE AREA AND CHEEK AREA WHERE PATIENT HAD LACERATIONS. AS PER PATIENT, MAY BE OLD. AS HE FELL IN DINING ROOM.

## 2023-06-19 NOTE — ED PROCEDURE NOTE - CPROC ED TIME OUT STATEMENT1
“Patient's name, , procedure and correct site were confirmed during the [place] Timeout.”
“Patient's name, , procedure and correct site were confirmed during the [place] Timeout.”
“Patient's name, , procedure and correct site were confirmed during the [place] Timeout.”
“Patient's name, , procedure and correct site were confirmed during the [place] Timeout.”

## 2023-06-19 NOTE — ED PROCEDURE NOTE - CPROC ED LACER REPAIR DETAIL1
The wound was explored to base in bloodless field./Multiple flaps were aligned.
The wound was explored to base in bloodless field.

## 2023-06-19 NOTE — ED PROCEDURE NOTE - NS ED ATTENDING STATEMENT MOD
This was a shared visit with the [facility]. I reviewed and verified the documentation and independently performed the documented:

## 2023-06-22 NOTE — ASSESSMENT
Prior Authorization Retail Medication Request    Medication/Dose: Trelegy Ellipta 100-62.5-25   ICD code (if different than what is on RX):    Previously Tried and Failed:    Rationale:      Insurance Name:    Insurance ID:        Pharmacy Information (if different than what is on RX)  Name:  [facility]  Phone:  [phone]     [FreeTextEntry1] : ROBYN PETTIT is a 68 year M  being seen in our office for a follow up for an ascending aortic aneurysm. Patient was educated on the presentation of the aneurysm, as well as signs and symptoms of rupture. They were instructed to go to the ER for any chest pain, and back pain. Patient was instructed to maintain low salt diet, as well as maintain blood pressure parameters less than 130 mm hg systolic as well as a heart rate less than 100 bpm.\par \par Plan:\par 5-10 MInutes of Televisit rendered\par WIll plan for CT Chest in year \par F/U after for review\par F/U with Dr. Cho for echocardiogram\par F/U with PMD for cirrhosis findings on CT, states has been following\par \par Selena PEREZ [facility], am acting as scribe for \par \par I personally performed the services described in the documentation, reviewed the documentation recorded by the scribe in my presence and it accurately and completely records my words and actions.\par -FMR\par

## 2023-06-22 NOTE — HISTORY OF PRESENT ILLNESS
[Home] : at home, [unfilled] , at the time of the visit. [Medical Office: ([facility])___] : at the medical office located in  [Verbal consent obtained from patient] : the patient, [unfilled] [FreeTextEntry1] : Pt scheduled for telehealth and could not access Teledoc platform d/t technical difficulties or lack of smart phone device\par \par Mr. PETTIT 69 y/o M arrives today for his annual check for his ascending aortic aneurysm.  PMH CABG 2017 and Ascending Aortic Aneurysm.   Here for review of recent  CT Chest.\par \par Feels well denies any chest/back pain or SOB\par ECOG 0\par Lives with family\par \par His current healthcare team is as follows:\par Cardio: [facility]\par PMD: Marlyop\par Pulm: [facility] \par

## 2023-06-22 NOTE — ASSESSMENT
[FreeTextEntry1] : ROBYN PETTIT is a 68 year M  being seen in our office for a follow up for an ascending aortic aneurysm. Patient was educated on the presentation of the aneurysm, as well as signs and symptoms of rupture. They were instructed to go to the ER for any chest pain, and back pain. Patient was instructed to maintain low salt diet, as well as maintain blood pressure parameters less than 130 mm hg systolic as well as a heart rate less than 100 bpm.\par \par Plan:\par 5-10 MInutes of Televisit rendered\par WIll plan for CT Chest in year \par F/U after for review\par F/U with Dr. Cho for echocardiogram\par F/U with PMD for cirrhosis findings on CT, states has been following\par \par Selena PEREZ [facility], am acting as scribe for \par \par I personally performed the services described in the documentation, reviewed the documentation recorded by the scribe in my presence and it accurately and completely records my words and actions.\par -FMR\par

## 2023-06-22 NOTE — ASSESSMENT
[FreeTextEntry1] : ROBYN PETTIT is a 68 year M  being seen in our office for a follow up for an ascending aortic aneurysm. Patient was educated on the presentation of the aneurysm, as well as signs and symptoms of rupture. They were instructed to go to the ER for any chest pain, and back pain. Patient was instructed to maintain low salt diet, as well as maintain blood pressure parameters less than 130 mm hg systolic as well as a heart rate less than 100 bpm.\par \par Plan:\par 5-10 MInutes of Televisit rendered\par WIll plan for CT Chest in year \par F/U after for review\par F/U with Dr. Cho for echocardiogram\par F/U with PMD for cirrhosis findings on CT, states has been following\par \par Selena PEREZ [facility], am acting as scribe for \par \par I personally performed the services described in the documentation, reviewed the documentation recorded by the scribe in my presence and it accurately and completely records my words and actions.\par -FMR\par

## 2023-06-22 NOTE — HISTORY OF PRESENT ILLNESS
[Home] : at home, [unfilled] , at the time of the visit. [Medical Office: ([facility])___] : at the medical office located in  [Verbal consent obtained from patient] : the patient, [unfilled] [FreeTextEntry1] : Pt scheduled for telehealth and could not access Teledoc platform d/t technical difficulties or lack of smart phone device\par \par Mr. PETTIT 69 y/o M arrives today for his annual check for his ascending aortic aneurysm.  PMH CABG 2017 and Ascending Aortic Aneurysm.   Here for review of recent  CT Chest.\par \par Feels well denies any chest/back pain or SOB\par ECOG 0\par Lives with family\par \par His current healthcare team is as follows:\par Cardio: [facility]\par PMD: Marlyop\par Pulm: [facility] \par

## 2023-06-23 ENCOUNTER — EMERGENCY (EMERGENCY)
Facility: HOSPITAL | Age: 68
LOS: 0 days | Discharge: HOME | End: 2023-06-23
Attending: STUDENT IN AN ORGANIZED HEALTH CARE EDUCATION/TRAINING PROGRAM
Payer: MEDICARE

## 2023-06-23 VITALS
WEIGHT: 250 LBS | TEMPERATURE: 100 F | HEART RATE: 60 BPM | RESPIRATION RATE: 18 BRPM | OXYGEN SATURATION: 100 % | DIASTOLIC BLOOD PRESSURE: 84 MMHG | SYSTOLIC BLOOD PRESSURE: 144 MMHG

## 2023-06-23 DIAGNOSIS — E11.9 TYPE 2 DIABETES MELLITUS WITHOUT COMPLICATIONS: ICD-10-CM

## 2023-06-23 DIAGNOSIS — Z88.0 ALLERGY STATUS TO PENICILLIN: ICD-10-CM
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
## 2023-06-23 DIAGNOSIS — I49.3 VENTRICULAR PREMATURE DEPOLARIZATION: ICD-10-CM

## 2023-06-23 DIAGNOSIS — M79.89 OTHER SPECIFIED SOFT TISSUE DISORDERS: ICD-10-CM

## 2023-06-23 DIAGNOSIS — S00.83XA CONTUSION OF OTHER PART OF HEAD, INITIAL ENCOUNTER: ICD-10-CM

## 2023-06-23 DIAGNOSIS — Z95.1 PRESENCE OF AORTOCORONARY BYPASS GRAFT: ICD-10-CM

## 2023-06-23 DIAGNOSIS — Z88.7 ALLERGY STATUS TO SERUM AND VACCINE: ICD-10-CM

## 2023-06-23 DIAGNOSIS — Z48.02 ENCOUNTER FOR REMOVAL OF SUTURES: ICD-10-CM

## 2023-06-23 DIAGNOSIS — J44.9 CHRONIC OBSTRUCTIVE PULMONARY DISEASE, UNSPECIFIED: ICD-10-CM
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
## 2023-06-23 DIAGNOSIS — I31.39 OTHER PERICARDIAL EFFUSION (NONINFLAMMATORY): ICD-10-CM

## 2023-06-23 LAB
ALBUMIN SERPL ELPH-MCNC: 3.5 G/DL — SIGNIFICANT CHANGE UP (ref 3.5–5.2)
ALP SERPL-CCNC: 58 U/L — SIGNIFICANT CHANGE UP (ref 30–115)
ALT FLD-CCNC: 29 U/L — SIGNIFICANT CHANGE UP (ref 0–41)
ANION GAP SERPL CALC-SCNC: 11 MMOL/L — SIGNIFICANT CHANGE UP (ref 7–14)
AST SERPL-CCNC: 135 U/L — HIGH (ref 0–41)
BASOPHILS # BLD AUTO: 0.04 K/UL — SIGNIFICANT CHANGE UP (ref 0–0.2)
BASOPHILS NFR BLD AUTO: 0.3 % — SIGNIFICANT CHANGE UP (ref 0–1)
BILIRUB SERPL-MCNC: 3.9 MG/DL — HIGH (ref 0.2–1.2)
BUN SERPL-MCNC: 11 MG/DL — SIGNIFICANT CHANGE UP (ref 10–20)
CALCIUM SERPL-MCNC: 8.7 MG/DL — SIGNIFICANT CHANGE UP (ref 8.4–10.4)
CHLORIDE SERPL-SCNC: 91 MMOL/L — LOW (ref 98–110)
CO2 SERPL-SCNC: 23 MMOL/L — SIGNIFICANT CHANGE UP (ref 17–32)
CREAT SERPL-MCNC: 0.8 MG/DL — SIGNIFICANT CHANGE UP (ref 0.7–1.5)
EGFR: 96 ML/MIN/1.73M2 — SIGNIFICANT CHANGE UP
EOSINOPHIL # BLD AUTO: 0.02 K/UL — SIGNIFICANT CHANGE UP (ref 0–0.7)
EOSINOPHIL NFR BLD AUTO: 0.2 % — SIGNIFICANT CHANGE UP (ref 0–8)
GLUCOSE SERPL-MCNC: 124 MG/DL — HIGH (ref 70–99)
HCT VFR BLD CALC: 32 % — LOW (ref 42–52)
HGB BLD-MCNC: 11.3 G/DL — LOW (ref 14–18)
IMM GRANULOCYTES NFR BLD AUTO: 0.7 % — HIGH (ref 0.1–0.3)
LYMPHOCYTES # BLD AUTO: 1.43 K/UL — SIGNIFICANT CHANGE UP (ref 1.2–3.4)
LYMPHOCYTES # BLD AUTO: 11.3 % — LOW (ref 20.5–51.1)
MCHC RBC-ENTMCNC: 35.3 G/DL — SIGNIFICANT CHANGE UP (ref 32–37)
MCHC RBC-ENTMCNC: 36.1 PG — HIGH (ref 27–31)
MCV RBC AUTO: 102.2 FL — HIGH (ref 80–94)
MONOCYTES # BLD AUTO: 1.42 K/UL — HIGH (ref 0.1–0.6)
MONOCYTES NFR BLD AUTO: 11.2 % — HIGH (ref 1.7–9.3)
NEUTROPHILS # BLD AUTO: 9.68 K/UL — HIGH (ref 1.4–6.5)
NEUTROPHILS NFR BLD AUTO: 76.3 % — HIGH (ref 42.2–75.2)
NRBC # BLD: 0 /100 WBCS — SIGNIFICANT CHANGE UP (ref 0–0)
NT-PROBNP SERPL-SCNC: 400 PG/ML — HIGH (ref 0–300)
PLATELET # BLD AUTO: 147 K/UL — SIGNIFICANT CHANGE UP (ref 130–400)
PMV BLD: 12.3 FL — HIGH (ref 7.4–10.4)
POTASSIUM SERPL-MCNC: SIGNIFICANT CHANGE UP MMOL/L (ref 3.5–5)
POTASSIUM SERPL-SCNC: SIGNIFICANT CHANGE UP MMOL/L (ref 3.5–5)
PROT SERPL-MCNC: 8.2 G/DL — HIGH (ref 6–8)
RBC # BLD: 3.13 M/UL — LOW (ref 4.7–6.1)
RBC # FLD: 16.1 % — HIGH (ref 11.5–14.5)
SODIUM SERPL-SCNC: 125 MMOL/L — LOW (ref 135–146)
TROPONIN T SERPL-MCNC: <0.01 NG/ML — SIGNIFICANT CHANGE UP
WBC # BLD: 12.68 K/UL — HIGH (ref 4.8–10.8)
WBC # FLD AUTO: 12.68 K/UL — HIGH (ref 4.8–10.8)

## 2023-06-23 PROCEDURE — 93005 ELECTROCARDIOGRAM TRACING: CPT

## 2023-06-23 PROCEDURE — 36415 COLL VENOUS BLD VENIPUNCTURE: CPT

## 2023-06-23 PROCEDURE — 83880 ASSAY OF NATRIURETIC PEPTIDE: CPT

## 2023-06-23 PROCEDURE — 71045 X-RAY EXAM CHEST 1 VIEW: CPT

## 2023-06-23 PROCEDURE — 99285 EMERGENCY DEPT VISIT HI MDM: CPT | Mod: 25

## 2023-06-23 PROCEDURE — 93010 ELECTROCARDIOGRAM REPORT: CPT

## 2023-06-23 PROCEDURE — 80053 COMPREHEN METABOLIC PANEL: CPT

## 2023-06-23 PROCEDURE — 99285 EMERGENCY DEPT VISIT HI MDM: CPT

## 2023-06-23 PROCEDURE — 71045 X-RAY EXAM CHEST 1 VIEW: CPT | Mod: 26

## 2023-06-23 PROCEDURE — 85025 COMPLETE CBC W/AUTO DIFF WBC: CPT

## 2023-06-23 PROCEDURE — 84484 ASSAY OF TROPONIN QUANT: CPT

## 2023-06-23 NOTE — ED PROVIDER NOTE - PATIENT PORTAL LINK FT
You can access the FollowMyHealth Patient Portal offered by [facility] by registering at the following website: [email or web address]. By joining [facility]’s FollowMyHealth portal, you will also be able to view your health information using other applications (apps) compatible with our system.

## 2023-06-23 NOTE — ED ADULT NURSE NOTE - CHIEF COMPLAINT QUOTE
Patient here for suture removal to left side of face, patient states they were placed last Sunday, patient also here for swollen lower extremities

## 2023-06-23 NOTE — ED ADULT TRIAGE NOTE - CHIEF COMPLAINT QUOTE
Patient here for suture removal to left side of face, patient states they were placed last Sunday Patient here for suture removal to left side of face, patient states they were placed last Sunday, patient also here for swollen lower extremities

## 2023-06-23 NOTE — ED ADULT NURSE NOTE - NSFALLRISKINTERV_ED_ALL_ED

## 2023-06-23 NOTE — ED PROVIDER NOTE - PROGRESS NOTE DETAILS
NANCY-- Bedside pocus shows normal ejection fraction, small pericardial effusion, no sign of right heart strain.    Sutures removed from nasal bridge, however sutures were left in place over left cheek as wound appears incompletely healed.     Pt reports he does not want to stay for further evaluation of leg swelling at this time. Discussed at length about importance of further workup, however patient still refusing.     Pt agrees with plan to follow up outpatient with his PMD Dr. Gongora and Cardiologist Dr. Oquendo, as well as ENT in 3 days. NANCY-- Bedside pocus shows normal ejection fraction, small pericardial effusion, no sign of right heart strain.    Sutures removed from nasal bridge, however sutures were left in place over left cheek as wound appears incompletely healed.     Pt reports he does not want to stay for further evaluation of leg swelling at this time. Discussed at length about importance of further workup, however patient still refusing.     Pt agrees with plan to follow up outpatient with his PMD Dr. Gongora and Cardiologist Dr. Aggarwal, as well as ENT in 3 days and reports he already has appointments set up.

## 2023-06-23 NOTE — ED PROVIDER NOTE - CLINICAL SUMMARY MEDICAL DECISION MAKING FREE TEXT BOX
69 yo M presented for eval of suture removal and leg swelling. Labs and EKG were ordered and reviewed.  Imaging was ordered and reviewed by me. Patient's records (prior hospital, ED visit) were reviewed.  Additional history was obtained from family. Sutures to nose removed. Sutures on L cheek left in place as the wound is still healing - patient advised to return in 24-48 hours for removal. BNP slightly elevated, and K hemolyzed on BMP TNP. Pt refuses to stay for repeat BMP or continued evaluation for lower extremity edema, wishes to leave AMA, and prefers to follow up with his own doctors outpatient. The patient wishes to leave against medical advice.  I have discussed the risks, benefits and alternatives (including the possibility of worsening of disease, pain, permanent disability, and/or death) with the patient and his/her family (if available).  The patient voices understanding of these risks, benefits, and alternatives and still wishes to sign out against medical advice.  The patient is awake, alert, oriented x 3 and has demonstrated capacity to refuse/direct care.  I have advised the patient that they can and should return immediately should they develop any worse/different/additional symptoms, or if they change their mind and want to continue their care. Patient has full capacity and has verbalized understanding.  Given detailed returned precautions.

## 2023-06-23 NOTE — ED PROVIDER NOTE - NSFOLLOWUPINSTRUCTIONS_ED_ALL_ED_FT
Edema  A person's legs and feet. One leg is normal and the other leg is affected by edema.  Edema is when you have too much fluid in your body or under your skin. Edema may make your legs, feet, and ankles swell. Swelling often happens in looser tissues, such as around your eyes. This is a common condition. It gets more common as you get older.    There are many possible causes of edema. These include:  Eating too much salt (sodium).  Being on your feet or sitting for a long time.  Certain medical conditions, such as:  Pregnancy.  Heart failure.  Liver disease.  Kidney disease.  Cancer.  Hot weather may make edema worse. Edema is usually painless. Your skin may look swollen or shiny.    Follow these instructions at home:  Medicines    Take over-the-counter and prescription medicines only as told by your doctor.  Your doctor may prescribe a medicine to help your body get rid of extra water (diuretic). Take this medicine if you are told to take it.  Eating and drinking    Eat a low-salt (low-sodium) diet as told by your doctor. Sometimes, eating less salt may reduce swelling.  Depending on the cause of your swelling, you may need to limit how much fluid you drink (fluid restriction).  General instructions    Raise the injured area above the level of your heart while you are sitting or lying down.  Do not sit still or stand for a long time.  Do not wear tight clothes. Do not wear garters on your upper legs.  Exercise your legs. This can help the swelling go down.  Wear compression stockings as told by your doctor. It is important that these are the right size. These should be prescribed by your doctor to prevent possible injuries.  If elastic bandages or wraps are recommended, use them as told by your doctor.  Contact a doctor if:  Treatment is not working.  You have heart, liver, or kidney disease and have symptoms of edema.  You have sudden and unexplained weight gain.  Get help right away if:  You have shortness of breath or chest pain.  You cannot breathe when you lie down.  You have pain, redness, or warmth in the swollen areas.  You have heart, liver, or kidney disease and get edema all of a sudden.  You have a fever and your symptoms get worse all of a sudden.  These symptoms may be an emergency. Get help right away. Call 911.  Do not wait to see if the symptoms will go away.  Do not drive yourself to the hospital.  Summary  Edema is when you have too much fluid in your body or under your skin.  Edema may make your legs, feet, and ankles swell. Swelling often happens in looser tissues, such as around your eyes.  Raise the injured area above the level of your heart while you are sitting or lying down.  Follow your doctor's instructions about diet and how much fluid you can drink.  This information is not intended to replace advice given to you by your health care provider. Make sure you discuss any questions you have with your health care provider.

## 2023-06-23 NOTE — ED ADULT NURSE NOTE - OBJECTIVE STATEMENT
Pt presents to the ED c/o swelling of bilateral feet and suture removal. Pt was here on Sunday s/p fall requiring sutures, was told to come back for removal. Pt states that he has noticed since Monday his feet have been swelling and are more swollen than they usual are.

## 2023-06-23 NOTE — ED PROVIDER NOTE - ATTENDING CONTRIBUTION TO CARE
67 yo M PMHx DM, COPD not on home O2, CABG, presents to ED for eval of multiple medical complaints. Pt here for suture removal of sutures placed to face after mechanical fall at home 5 days ago, pt evaluated and sutures placed in our ED. Pt denies fevers, chills, drainage from suture site. Pt also reporting since the fall noticing bilateral LE swelling. No associated numbness or weakness, no chest pain.     Vital Signs: I have reviewed the initial vital signs.  Constitutional: Patient in no acute distress.  Integumentary: well healing laceration to nose with 4 sutures in place; still healing laceration to L cheek 6 sutures in place - no surrounding erythema or crepitus.   EENT: MMM; EOMI, PERRL, + L sided conjunctival injection with periorbital healing ecchymoses b/l   NECK: Supple.   Cardiovascular: RRR, radial pulses 2/4 bilaterally.   Respiratory: CTA B/L.   Gastrointestinal: Abdomen soft, non-tender, non-distended.  Musculoskeletal: +bilateral 2+ pitting LE edema to knee, distal pulses intact.   Neurologic: AAOx3, motor 5/5 and sensation intact throughout upper and lower ext

## 2023-06-23 NOTE — ED PROVIDER NOTE - CARE PLAN
Principal Discharge DX:	Leg swelling  Secondary Diagnosis:	Visit for suture removal   1 Principal Discharge DX:	Leg swelling  Assessment and plan of treatment:	Plan- suture removal , EKG cxr labs  Secondary Diagnosis:	Visit for suture removal

## 2023-06-23 NOTE — ED PROVIDER NOTE - PHYSICAL EXAMINATION
CONSTITUTIONAL: awake, sitting in stretcher in no acute distress  SKIN: Warm, dry  HEAD: Normocephalic; + resolving ecchymosis to bilateral cheeks and forehead, well healed laceration over the nose with 4 sutures in place, still healing laceration to left cheek with 6 sutures in place, with mild amount of dried blood around it.  EYES: +left eye with conjunctival hemorrhage, EOMI. PERRL  ENT: No nasal discharge; oropharynx nonerythematous; airway clear  NECK: Supple; non tender, no lymphadenopathy  CARD:  Regular rate and rhythm; S1, S2 normal; no murmurs, gallops, or rubs  RESP: CTAB; No wheezes, crackles, rales or rhonchi  ABD: Soft, distended, nontender, nonrigid, no guarding or rebound tenderness  EXT: 3+ pitting edema to bilateral lower legs  NEURO: A&O x3, speaking in full clear sentences, no facial asymmetry, moving all extremities

## 2023-06-26 ENCOUNTER — APPOINTMENT (OUTPATIENT)
Dept: OTOLARYNGOLOGY | Facility: CLINIC | Age: 68
End: 2023-06-26
Payer: MEDICARE

## 2023-06-26 VITALS — HEIGHT: 74 IN | BODY MASS INDEX: 32.34 KG/M2 | WEIGHT: 252 LBS

## 2023-06-26 PROCEDURE — 99204 OFFICE O/P NEW MOD 45 MIN: CPT | Mod: 25

## 2023-06-26 PROCEDURE — 31231 NASAL ENDOSCOPY DX: CPT

## 2023-06-26 NOTE — PROCEDURE
[Rigid Endoscope] : examined with a rigid endoscope [Normal] : the paranasal sinuses had no abnormalities [FreeTextEntry6] : The following anatomic sites were directly examined in a sequential fashion:\par The scope was introduced in the nasal passage between the middle and inferior turbinates to exam the inferior portion of the middle meatus and the fontanelle, as well as the maxillary ostia. Next, the scope was passed medically and posteriorly to the middle turbinates to examine the sphenoethmoid recess and the superior turbinate region.\par no septal hematoma  [de-identified] : nasal trauma

## 2023-06-26 NOTE — DATA REVIEWED
[de-identified] : Test was reviewed  and interpreted by me. See official report below.\par CT CERVICAL SPINE ORDERED BY: CAMILA MONTERO\par \par ACC: [id number] EXAM: CT MAXILLOFACIAL ORDERED BY: CAMILA MONTERO\par \par ACC: [id number] EXAM: CT BRAIN ORDERED BY: CAMILA MONTERO\par \par PROCEDURE DATE: 06/18/2023\par \par \par \par INTERPRETATION: CLINICAL HISTORY: Trauma.\par \par TECHNIQUE: CT of the head, cervical spine, and maxillofacial region was performed without administration of intravenous contrast. Multiple transaxial images and sagittal and coronal reformatted images were submitted.\par \par FINDINGS:\par \par CT HEAD:\par \par The third and lateral ventricles are mildly enlarged as are the cortical sulci consistent with a mild degree of cortical atrophy. The fourth ventricle is normal in size and position.\par \par No acute territorial infarct, intracranial hemorrhage, or midline shift.\par \par No depressed calvarial fracture.\par \par Opacification of scattered mastoid air cells secondary to inflammation or infection.\par \par Mucosal thickening in the frontal, sphenoid, and maxillary sinuses.\par \par Left periorbital soft tissue swelling and left periorbital emphysema.\par \par Fracture of the left lamina papyracea. Moderate opacification of the left ethmoid air cells likely representing hemorrhage in view of the fracture.\par \par There are calcifications in the supraclinoid carotid arteries.\par \par CT MAXILLOFACIAL\par \par Motion artifact and streak artifact from dental hardware limits the examination.\par \par Extensive left periorbital soft tissue swelling. There is left periorbital emphysema. Diffuse soft tissue swelling anterior to the nasal bones.\par \par Punctate radiodensity within the anterior soft tissues anterior to the right nasal bone may represent a foreign body.\par \par Soft tissue laceration anterolateral to the left zygomatic arch with punctate radiodensities likely representing foreign bodies.\par \par Fracture of the left lamina papyracea and bilateral nasal bones. There is medial displacement of the distal right nasal bone fracture.\par \par The left medial rectus muscle is thickened likely secondary to hemorrhage, foci of air originating from the sinus are seen within the post septal and preseptal soft tissues, and there is left-sided proptosis.\par \par Almost complete opacification of the left ethmoid sinus likely representing hemorrhage in view of the above-described fracture.\par \par There is mucosal thickening within the bilateral maxillary sinuses, sphenoid sinuses, and frontal sinuses.\par \par Periodontal disease involving the left maxilla.\par \par Vascular calcifications are noted.\par \par CT C-SPINE:\par \par Motion artifact mildly limits evaluation.\par \par Sternal wires, incompletely imaged.\par \par No definite acute displaced cervical spine fracture or facet joint dislocation demonstrated.\par \par Straightening of the normal cervical lordosis may be secondary to patient positioning or muscle spasm.\par \par Slight anterior listhesis of C3-4 on C5 likely secondary to degenerative changes.\par \par At C2-C3 there are hypertrophic facet changes. The neural foramen are patent.\par \par At C3-C4 there are hypertrophic facet changes mild right and moderate left neural foraminal narrowing.\par \par At C4-C5 there are hypertrophic facet changes and moderate right and mild left neural foraminal narrowing.\par \par At C5-C6 there is a vacuum disc phenomenon. Mild midline disc osteophyte complex resulting in mild narrowing of the AP diameter of the spinal canal. There are hypertrophic facet changes and degenerative changes arising from the uncovertebral joints resulting in moderate to severe right and moderate left neural foraminal narrowing.\par \par At C6-C7 there is a vacuum disc phenomenon. Mild midline disc osteophyte complex resulting in mild narrowing of the AP diameter of the spinal canal. There are hypertrophic facet changes and degenerative changes arising from the uncovertebral joints resulting in mild bilateral neural foraminal narrowing.\par \par Arising in the left lobe of the thyroid gland is a 1.5 cm calcified lesion.\par \par IMPRESSION:\par \par CT HEAD:\par \par No acute intracranial hemorrhage.\par \par CT MAXILLOFACIAL:\par \par Left lamina papyracea fracture, bilateral nasal bone fractures, left sided proptosis, left periorbital soft tissue swelling and periorbital emphysema, left medial rectus muscle is thickened likely secondary to hemorrhage.\par \par Punctate radiodensity anterior to the right nasal bone may represent a foreign body.\par \par Left facial soft tissue laceration with punctate radiodensities likely representing foreign bodies.\par \par CT C-SPINE:\par \par Motion artifact limits evaluation.\par \par There is no CT evidence of acute displaced fracture or facet joint dislocation.\par \par Straightening of the normal cervical lordosis may be secondary to patient positioning or muscle spasm.\par \par Multilevel degenerative changes.\par \par Dr. Betty Allen discussed preliminary findings with CAMILA MONTERO MD; Resident [place]au on 6/18/2023 9:36 PM with readback.\par \par Final report: Spoke with EDGARDO BENTON; Emergency Medicine on 6/19/2023 9:21 AM with readback.\par \par --- End of Report ---\par \par \par

## 2023-06-26 NOTE — HISTORY OF PRESENT ILLNESS
[de-identified] : Patient present today c/o nasal trauma and facial  trauma .  He missed step and fell on father's day 6/18/2023 and went to ER. As per pt, CTH reveals L orbital fx.  Denies blood thinners/anticoagulants. No difficulty  breathing  from nose . Has   sutures  placed on nose and  had some placed on  left side of  face .

## 2023-06-26 NOTE — PHYSICAL EXAM
[Normal] : mucosa is normal [Midline] : trachea located in midline position [FreeTextEntry1] : left laceration with sutures, that were removed  [de-identified] : nasal swelling, laceration  [de-identified] : periorbital ecchymosis

## 2023-07-25 ENCOUNTER — APPOINTMENT (OUTPATIENT)
Dept: OTOLARYNGOLOGY | Facility: CLINIC | Age: 68
End: 2023-07-25

## 2023-08-09 ENCOUNTER — APPOINTMENT (OUTPATIENT)
Dept: OTOLARYNGOLOGY | Facility: CLINIC | Age: 68
End: 2023-08-09
Payer: MEDICARE

## 2023-08-09 VITALS — HEIGHT: 74 IN | WEIGHT: 221 LBS | BODY MASS INDEX: 28.36 KG/M2

## 2023-08-09 DIAGNOSIS — H60.8X3 OTHER OTITIS EXTERNA, BILATERAL: ICD-10-CM

## 2023-08-09 DIAGNOSIS — S02.2XXA FRACTURE OF NASAL BONES, INITIAL ENCOUNTER FOR CLOSED FRACTURE: ICD-10-CM

## 2023-08-09 DIAGNOSIS — S09.93XA UNSPECIFIED INJURY OF FACE, INITIAL ENCOUNTER: ICD-10-CM

## 2023-08-09 DIAGNOSIS — H61.893 OTHER SPECIFIED DISORDERS OF EXTERNAL EAR, BILATERAL: ICD-10-CM

## 2023-08-09 DIAGNOSIS — S02.85XA FX OF ORBIT, UNSP, INT ENC FOR CL: ICD-10-CM

## 2023-08-09 DIAGNOSIS — H61.23 IMPACTED CERUMEN, BILATERAL: ICD-10-CM

## 2023-08-09 PROCEDURE — 31231 NASAL ENDOSCOPY DX: CPT

## 2023-08-09 PROCEDURE — 99213 OFFICE O/P EST LOW 20 MIN: CPT | Mod: 25

## 2023-08-09 PROCEDURE — 69200 CLEAR OUTER EAR CANAL: CPT | Mod: 50

## 2023-08-09 RX ORDER — MOMETASONE FUROATE 1 MG/G
0.1 CREAM TOPICAL TWICE DAILY
Qty: 1 | Refills: 3 | Status: ACTIVE | COMMUNITY
Start: 2023-08-09 | End: 1900-01-01

## 2023-08-09 NOTE — PHYSICAL EXAM
[Normal] : mucosa is normal [Midline] : trachea located in midline position [de-identified] : Dry, flaky, B/L cerumen impaction removed with curette [] : septum deviated bilaterally

## 2023-08-09 NOTE — PROCEDURE
[Flexible Endoscope] : examined with the flexible endoscope [Congested] : congested [Shreya] : shreya [FreeTextEntry6] : The following anatomic sites were directly examined in a sequential fashion: The scope was introduced in the nasal passage between the middle and inferior turbinates to exam the inferior portion of the middle meatus and the fontanelle, as well as the maxillary ostia. Next, the scope was passed medically and posteriorly to the middle turbinates to examine the sphenoethmoid recess and the superior turbinate region.

## 2023-08-09 NOTE — REASON FOR VISIT
[Subsequent Evaluation] : a subsequent evaluation for [FreeTextEntry2] : facial trauma, fracture orbital, nasal fracture

## 2023-08-09 NOTE — HISTORY OF PRESENT ILLNESS
[FreeTextEntry1] : Patient returns today c/o facial trauma, fracture orbital, nasal fracture. States that he is doing well. No pain or discomfort. No difficulty breathing. Pt has some clogged ears. Pt seen by ophthalmology.

## 2023-12-11 ENCOUNTER — APPOINTMENT (OUTPATIENT)
Dept: CARDIOLOGY | Facility: CLINIC | Age: 68
End: 2023-12-11
Payer: MEDICARE

## 2023-12-11 ENCOUNTER — NON-APPOINTMENT (OUTPATIENT)
Age: 68
End: 2023-12-11

## 2023-12-11 ENCOUNTER — RESULT CHARGE (OUTPATIENT)
Age: 68
End: 2023-12-11

## 2023-12-11 VITALS — DIASTOLIC BLOOD PRESSURE: 80 MMHG | SYSTOLIC BLOOD PRESSURE: 126 MMHG | RESPIRATION RATE: 18 BRPM | HEART RATE: 66 BPM

## 2023-12-11 VITALS — BODY MASS INDEX: 27.46 KG/M2 | WEIGHT: 214 LBS | HEIGHT: 74 IN

## 2023-12-11 DIAGNOSIS — Z95.1 PRESENCE OF AORTOCORONARY BYPASS GRAFT: ICD-10-CM

## 2023-12-11 PROCEDURE — 93000 ELECTROCARDIOGRAM COMPLETE: CPT

## 2023-12-11 PROCEDURE — 99214 OFFICE O/P EST MOD 30 MIN: CPT

## 2023-12-11 RX ORDER — FUROSEMIDE 40 MG/1
40 TABLET ORAL
Refills: 0 | Status: ACTIVE | COMMUNITY

## 2024-03-07 ENCOUNTER — APPOINTMENT (OUTPATIENT)
Dept: GASTROENTEROLOGY | Facility: CLINIC | Age: 69
End: 2024-03-07
Payer: MEDICARE

## 2024-03-07 DIAGNOSIS — K86.9 DISEASE OF PANCREAS, UNSPECIFIED: ICD-10-CM

## 2024-03-07 DIAGNOSIS — Z86.010 PERSONAL HISTORY OF COLONIC POLYPS: ICD-10-CM

## 2024-03-07 PROCEDURE — 99204 OFFICE O/P NEW MOD 45 MIN: CPT

## 2024-03-07 NOTE — REASON FOR VISIT
[Home] : at home, [unfilled] , at the time of the visit. [Medical Office: ([facility])___] : at the medical office located in  [Initial Evaluation] : an initial evaluation [FreeTextEntry4] : VIVEK FOREMAN [FreeTextEntry1] : Referred by Dr. Mota for ? lesion at the IC valve

## 2024-03-07 NOTE — HISTORY OF PRESENT ILLNESS
[FreeTextEntry1] : 68-year-old male with history of alcoholic cirrhosis (now compensated; quit drinking 1-2 y ago) who follows w/ Dr. Andreia Menon, HTN, CAD status post CABG, thoracic aneurysm being followed by CT surgery, DM 2, and recent +FIT and diagnosis of DORIAN s/p endoscopic eval w/ Dr. Mota, referred for evaluation of a possible lesion at the IC valve.  he is following w/ GI at [facility] and gets yearly MRI for panc lesion. he was reassured that it was benign and is stable in size.  last MRI was done in 2/2023. he is scheduled for rpt MRI later this month.   Patient has colonoscopy on 2/23/2024 with finding of a left colon TVA that was resected.  He also had a persistent left colon polyp.  There was a concern for a possible IC valve lesion that could not be well-evaluated due to colonic tortuosity. never had EGD.   Otherwise, patient denies any other GI complaints and has no abd pain, no nausea or vomiting, no dysphagia or odynophagia, no heartburn, no unintentional weight loss or appetite changes, no diarrhea, no constipation or changes in BMs including hematochezia or melena, no coffee ground emesis or hematemesis. ROS otherwise negative.   ----------------------------------------------- 1/18/2020 CT abdomen and pelvis with IV and oral contrast: Hepatomegaly.  Hepatic steatosis.  1.4 x 1.4 cm hypodensity within the pancreatic neck.  Mild splenomegaly.  Bilateral renal cysts.  ----------------------------------------------- 2/23/2024 colonoscopy: Suboptimal preparation.  Tortuous colon with difficult IC valve intubation.?  Friable mass/lesion in the IC valve versus trauma.  Proximal left colon pedunculated polyp (not removed).  1 cm proximal left colon pedunculated polyp status post polypectomy (TVA w/ HGD).  ----------------------------------------------- PMHx: alcoholic cirrhosis (now compensated; quit drinking 1-2 y ago) who follows w/ Dr. Andreia Menon, HTN, CAD status post CABG, thoracic aneurysm being followed by CT surgery, DM 2, and recent +FIT and diagnosis of DORIAN PSHx: ccy in 4/2022; CABG Fam hx: no fam hx of GI malignancy. Social hx: neg x 3 Meds: no a/c or antiplt - check med rec Allergies: nkda

## 2024-03-07 NOTE — ASSESSMENT
[FreeTextEntry1] : 68-year-old male with history of alcoholic cirrhosis (now compensated; quit drinking 1-2 y ago) who follows w/ Dr. Andreia Menon, HTN, CAD status post CABG, thoracic aneurysm being followed by CT surgery, DM 2, and recent +FIT and diagnosis of DORIAN s/p endoscopic eval w/ Dr. Mota, referred for evaluation of a possible lesion at the IC valve.  #colon polyps #? IC valve lesion -no family hx of CRC -no antiplatelets / anticoagulants -schedule patient for colonoscopy -prep sent to pharmacy -counseled about prep and dietary instructions incl 5 d of low fiber diet prior to procedure -clinic f/u after the procedure  -Rest of GI care per Dr. Mota  #pancreatic neck lesion Patient following with his primary doctor in [place] and having surveillance MRIs with him.  He is scheduled for MRI later this month.   We discussed consideration for EUS in the future but he wanted to hold off given stability of the lesion. He would like to continue following up with his physician at [place] and continue with surveillance MRIs.  #compensated alcoholic cirrhosis Patient is following with Dr. Andreia Menon and Dr. Mota Encouraged continuing alcohol avoidance Rest of management per primary GI and hepatologist

## 2024-04-04 ENCOUNTER — RESULT REVIEW (OUTPATIENT)
Age: 69
End: 2024-04-04

## 2024-04-04 ENCOUNTER — TRANSCRIPTION ENCOUNTER (OUTPATIENT)
Age: 69
End: 2024-04-04

## 2024-04-04 ENCOUNTER — OUTPATIENT (OUTPATIENT)
Dept: OUTPATIENT SERVICES | Facility: HOSPITAL | Age: 69
LOS: 1 days | Discharge: HOME | End: 2024-04-04
Payer: MEDICARE

## 2024-04-04 VITALS
HEART RATE: 64 BPM | SYSTOLIC BLOOD PRESSURE: 138 MMHG | RESPIRATION RATE: 15 BRPM | OXYGEN SATURATION: 97 % | DIASTOLIC BLOOD PRESSURE: 81 MMHG

## 2024-04-04 VITALS
HEART RATE: 79 BPM | TEMPERATURE: 97 F | RESPIRATION RATE: 18 BRPM | SYSTOLIC BLOOD PRESSURE: 173 MMHG | DIASTOLIC BLOOD PRESSURE: 92 MMHG

## 2024-04-04 DIAGNOSIS — Z86.010 PERSONAL HISTORY OF COLONIC POLYPS: ICD-10-CM

## 2024-04-04 DIAGNOSIS — Z90.49 ACQUIRED ABSENCE OF OTHER SPECIFIED PARTS OF DIGESTIVE TRACT: Chronic | ICD-10-CM

## 2024-04-04 DIAGNOSIS — Z95.1 PRESENCE OF AORTOCORONARY BYPASS GRAFT: Chronic | ICD-10-CM

## 2024-04-04 PROCEDURE — 88305 TISSUE EXAM BY PATHOLOGIST: CPT | Mod: 26

## 2024-04-04 PROCEDURE — C1889: CPT

## 2024-04-04 PROCEDURE — 88307 TISSUE EXAM BY PATHOLOGIST: CPT

## 2024-04-04 PROCEDURE — 88307 TISSUE EXAM BY PATHOLOGIST: CPT | Mod: 26

## 2024-04-04 PROCEDURE — 45385 COLONOSCOPY W/LESION REMOVAL: CPT

## 2024-04-04 PROCEDURE — 88305 TISSUE EXAM BY PATHOLOGIST: CPT

## 2024-04-04 PROCEDURE — 45390 COLONOSCOPY W/RESECTION: CPT | Mod: 59

## 2024-04-04 RX ORDER — METFORMIN HYDROCHLORIDE 850 MG/1
1 TABLET ORAL
Refills: 0 | DISCHARGE

## 2024-04-04 RX ORDER — SPIRONOLACT/HYDROCHLOROTHIAZID 25 MG-25MG
1 TABLET ORAL
Refills: 0 | DISCHARGE

## 2024-04-04 RX ORDER — FUROSEMIDE 40 MG
1 TABLET ORAL
Refills: 0 | DISCHARGE

## 2024-04-04 RX ORDER — METOPROLOL TARTRATE 50 MG
1 TABLET ORAL
Refills: 0 | DISCHARGE

## 2024-04-04 NOTE — ASU PATIENT PROFILE, ADULT - FALL HARM RISK - UNIVERSAL INTERVENTIONS
Bed in lowest position, wheels locked, appropriate side rails in place/Call bell, personal items and telephone in reach/Instruct patient to call for assistance before getting out of bed or chair/Non-slip footwear when patient is out of bed/[place] to call system/Physically safe environment - no spills, clutter or unnecessary equipment/Purposeful Proactive Rounding/Room/bathroom lighting operational, light cord in reach

## 2024-04-04 NOTE — ASU PATIENT PROFILE, ADULT - NSICDXPASTMEDICALHX_GEN_ALL_CORE_FT
PAST MEDICAL HISTORY:  Anemia of chronic disorder     CAD (coronary artery disease)     Cirrhosis     Diabetic on diet only     HTN (hypertension)

## 2024-04-04 NOTE — ASU PATIENT PROFILE, ADULT - PATIENT'S GENDER IDENTITY
Spoke to pt who stated he would like the Rx changed to name brand as this has happened in the past and they will be able to fill the name brand. Pt aware there is a co-pay with name brand.    Male

## 2024-04-04 NOTE — ASU DISCHARGE PLAN (ADULT/PEDIATRIC) - NS MD DC FALL RISK RISK
For information on Fall & Injury Prevention, visit: [email or web address] OR  [email or web address] OR  https://www.cdc.gov/steadi/patient.html

## 2024-04-04 NOTE — ASU PATIENT PROFILE, ADULT - NSICDXPASTSURGICALHX_GEN_ALL_CORE_FT
PAST SURGICAL HISTORY:  History of cholecystectomy     Status post cardiac revascularization with bypass aortocoronary anastomosis of five coronary vessels double

## 2024-04-05 LAB — SURGICAL PATHOLOGY STUDY: SIGNIFICANT CHANGE UP

## 2024-04-09 DIAGNOSIS — K57.30 DIVERTICULOSIS OF LARGE INTESTINE WITHOUT PERFORATION OR ABSCESS WITHOUT BLEEDING: ICD-10-CM

## 2024-04-09 DIAGNOSIS — D12.2 BENIGN NEOPLASM OF ASCENDING COLON: ICD-10-CM

## 2024-04-09 DIAGNOSIS — K63.5 POLYP OF COLON: ICD-10-CM

## 2024-04-09 DIAGNOSIS — Z95.1 PRESENCE OF AORTOCORONARY BYPASS GRAFT: ICD-10-CM

## 2024-04-09 DIAGNOSIS — Z88.0 ALLERGY STATUS TO PENICILLIN: ICD-10-CM

## 2024-04-09 DIAGNOSIS — K64.4 RESIDUAL HEMORRHOIDAL SKIN TAGS: ICD-10-CM

## 2024-04-09 DIAGNOSIS — D12.0 BENIGN NEOPLASM OF CECUM: ICD-10-CM

## 2024-04-09 DIAGNOSIS — I25.10 ATHEROSCLEROTIC HEART DISEASE OF NATIVE CORONARY ARTERY WITHOUT ANGINA PECTORIS: ICD-10-CM

## 2024-04-09 DIAGNOSIS — E11.9 TYPE 2 DIABETES MELLITUS WITHOUT COMPLICATIONS: ICD-10-CM

## 2024-04-09 DIAGNOSIS — Z88.7 ALLERGY STATUS TO SERUM AND VACCINE: ICD-10-CM

## 2024-04-09 DIAGNOSIS — D12.3 BENIGN NEOPLASM OF TRANSVERSE COLON: ICD-10-CM

## 2024-04-09 DIAGNOSIS — Z79.84 LONG TERM (CURRENT) USE OF ORAL HYPOGLYCEMIC DRUGS: ICD-10-CM

## 2024-06-26 ENCOUNTER — APPOINTMENT (OUTPATIENT)
Dept: CARDIOLOGY | Facility: CLINIC | Age: 69
End: 2024-06-26
Payer: MEDICARE

## 2024-06-26 VITALS — BODY MASS INDEX: 27.46 KG/M2 | WEIGHT: 214 LBS | HEIGHT: 74 IN | HEART RATE: 73 BPM

## 2024-06-26 VITALS — SYSTOLIC BLOOD PRESSURE: 118 MMHG | RESPIRATION RATE: 18 BRPM | HEART RATE: 73 BPM | DIASTOLIC BLOOD PRESSURE: 80 MMHG

## 2024-06-26 DIAGNOSIS — E11.9 TYPE 2 DIABETES MELLITUS W/OUT COMPLICATIONS: ICD-10-CM

## 2024-06-26 DIAGNOSIS — Z86.39 PERSONAL HISTORY OF OTHER ENDOCRINE, NUTRITIONAL AND METABOLIC DISEASE: ICD-10-CM

## 2024-06-26 DIAGNOSIS — Z86.79 PERSONAL HISTORY OF OTHER DISEASES OF THE CIRCULATORY SYSTEM: ICD-10-CM

## 2024-06-26 DIAGNOSIS — Z87.09 PERSONAL HISTORY OF OTHER DISEASES OF THE RESPIRATORY SYSTEM: ICD-10-CM

## 2024-06-26 DIAGNOSIS — I71.21 ANEURYSM OF THE ASCENDING AORTA, WITHOUT RUPTURE: ICD-10-CM

## 2024-06-26 DIAGNOSIS — I10 ESSENTIAL (PRIMARY) HYPERTENSION: ICD-10-CM

## 2024-06-26 DIAGNOSIS — K74.60 UNSPECIFIED CIRRHOSIS OF LIVER: ICD-10-CM

## 2024-06-26 DIAGNOSIS — I25.10 ATHEROSCLEROTIC HEART DISEASE OF NATIVE CORONARY ARTERY W/OUT ANGINA PECTORIS: ICD-10-CM

## 2024-06-26 DIAGNOSIS — I34.0 NONRHEUMATIC MITRAL (VALVE) INSUFFICIENCY: ICD-10-CM

## 2024-06-26 PROCEDURE — 99214 OFFICE O/P EST MOD 30 MIN: CPT

## 2024-06-26 PROCEDURE — 93000 ELECTROCARDIOGRAM COMPLETE: CPT

## 2024-06-26 PROCEDURE — G2211 COMPLEX E/M VISIT ADD ON: CPT

## 2024-06-26 RX ORDER — SPIRONOLACTONE 50 MG/1
50 TABLET ORAL DAILY
Refills: 0 | Status: ACTIVE | COMMUNITY

## 2024-06-26 RX ORDER — SODIUM SULFATE, POTASSIUM SULFATE AND MAGNESIUM SULFATE 1.6; 3.13; 17.5 G/177ML; G/177ML; G/177ML
17.5-3.13-1.6 SOLUTION ORAL
Qty: 2 | Refills: 0 | Status: DISCONTINUED | COMMUNITY
Start: 2024-03-07 | End: 2024-06-26

## 2024-06-26 RX ORDER — PEG-3350, SODIUM SULFATE, SODIUM CHLORIDE, POTASSIUM CHLORIDE, SODIUM ASCORBATE AND ASCORBIC ACID 7.5-2.691G
100 KIT ORAL
Qty: 1 | Refills: 0 | Status: DISCONTINUED | COMMUNITY
Start: 2024-03-07 | End: 2024-06-26

## 2024-06-27 PROBLEM — D63.8 ANEMIA IN OTHER CHRONIC DISEASES CLASSIFIED ELSEWHERE: Chronic | Status: ACTIVE | Noted: 2024-04-04

## 2024-06-27 PROBLEM — E11.9 TYPE 2 DIABETES MELLITUS WITHOUT COMPLICATIONS: Chronic | Status: ACTIVE | Noted: 2024-04-04

## 2024-06-27 PROBLEM — I10 ESSENTIAL (PRIMARY) HYPERTENSION: Chronic | Status: ACTIVE | Noted: 2024-04-04

## 2024-06-27 PROBLEM — I25.10 ATHEROSCLEROTIC HEART DISEASE OF NATIVE CORONARY ARTERY WITHOUT ANGINA PECTORIS: Chronic | Status: ACTIVE | Noted: 2024-04-04

## 2024-06-27 PROBLEM — K74.60 UNSPECIFIED CIRRHOSIS OF LIVER: Chronic | Status: ACTIVE | Noted: 2024-04-04

## 2024-07-14 ENCOUNTER — OUTPATIENT (OUTPATIENT)
Dept: OUTPATIENT SERVICES | Facility: HOSPITAL | Age: 69
LOS: 1 days | End: 2024-07-14
Payer: MEDICARE

## 2024-07-14 DIAGNOSIS — Z90.49 ACQUIRED ABSENCE OF OTHER SPECIFIED PARTS OF DIGESTIVE TRACT: Chronic | ICD-10-CM

## 2024-07-14 DIAGNOSIS — Z00.8 ENCOUNTER FOR OTHER GENERAL EXAMINATION: ICD-10-CM

## 2024-07-14 DIAGNOSIS — R06.2 WHEEZING: ICD-10-CM

## 2024-07-14 DIAGNOSIS — Z95.1 PRESENCE OF AORTOCORONARY BYPASS GRAFT: Chronic | ICD-10-CM

## 2024-07-14 PROCEDURE — 71250 CT THORAX DX C-: CPT | Mod: 26,MH

## 2024-07-14 PROCEDURE — 71250 CT THORAX DX C-: CPT

## 2024-07-15 DIAGNOSIS — R06.2 WHEEZING: ICD-10-CM

## 2024-07-17 ENCOUNTER — APPOINTMENT (OUTPATIENT)
Dept: CARDIOTHORACIC SURGERY | Facility: CLINIC | Age: 69
End: 2024-07-17
Payer: MEDICARE

## 2024-07-17 DIAGNOSIS — I71.21 ANEURYSM OF THE ASCENDING AORTA, WITHOUT RUPTURE: ICD-10-CM

## 2024-07-17 PROCEDURE — 99442: CPT | Mod: 93

## 2024-07-19 ENCOUNTER — APPOINTMENT (OUTPATIENT)
Dept: GASTROENTEROLOGY | Facility: CLINIC | Age: 69
End: 2024-07-19

## 2024-11-26 ENCOUNTER — INPATIENT (INPATIENT)
Facility: HOSPITAL | Age: 69
LOS: 4 days | Discharge: HOME HEALTH | DRG: 617 | End: 2024-12-01
Attending: INTERNAL MEDICINE | Admitting: STUDENT IN AN ORGANIZED HEALTH CARE EDUCATION/TRAINING PROGRAM
Payer: MEDICARE

## 2024-11-26 VITALS
WEIGHT: 218.04 LBS | TEMPERATURE: 98 F | HEIGHT: 74 IN | SYSTOLIC BLOOD PRESSURE: 143 MMHG | OXYGEN SATURATION: 97 % | RESPIRATION RATE: 17 BRPM | DIASTOLIC BLOOD PRESSURE: 95 MMHG | HEART RATE: 87 BPM

## 2024-11-26 DIAGNOSIS — D84.81 IMMUNODEFICIENCY DUE TO CONDITIONS CLASSIFIED ELSEWHERE: ICD-10-CM

## 2024-11-26 DIAGNOSIS — Z95.1 PRESENCE OF AORTOCORONARY BYPASS GRAFT: Chronic | ICD-10-CM

## 2024-11-26 DIAGNOSIS — L08.9 LOCAL INFECTION OF THE SKIN AND SUBCUTANEOUS TISSUE, UNSPECIFIED: ICD-10-CM

## 2024-11-26 DIAGNOSIS — Z98.890 OTHER SPECIFIED POSTPROCEDURAL STATES: ICD-10-CM

## 2024-11-26 DIAGNOSIS — K74.60 UNSPECIFIED CIRRHOSIS OF LIVER: ICD-10-CM

## 2024-11-26 DIAGNOSIS — Z79.84 LONG TERM (CURRENT) USE OF ORAL HYPOGLYCEMIC DRUGS: ICD-10-CM

## 2024-11-26 DIAGNOSIS — Z90.49 ACQUIRED ABSENCE OF OTHER SPECIFIED PARTS OF DIGESTIVE TRACT: Chronic | ICD-10-CM

## 2024-11-26 DIAGNOSIS — E11.621 TYPE 2 DIABETES MELLITUS WITH FOOT ULCER: ICD-10-CM

## 2024-11-26 DIAGNOSIS — E11.69 TYPE 2 DIABETES MELLITUS WITH OTHER SPECIFIED COMPLICATION: ICD-10-CM

## 2024-11-26 DIAGNOSIS — I10 ESSENTIAL (PRIMARY) HYPERTENSION: ICD-10-CM

## 2024-11-26 DIAGNOSIS — L97.528 NON-PRESSURE CHRONIC ULCER OF OTHER PART OF LEFT FOOT WITH OTHER SPECIFIED SEVERITY: ICD-10-CM

## 2024-11-26 DIAGNOSIS — E11.628 TYPE 2 DIABETES MELLITUS WITH OTHER SKIN COMPLICATIONS: ICD-10-CM

## 2024-11-26 DIAGNOSIS — D72.829 ELEVATED WHITE BLOOD CELL COUNT, UNSPECIFIED: ICD-10-CM

## 2024-11-26 DIAGNOSIS — E83.42 HYPOMAGNESEMIA: ICD-10-CM

## 2024-11-26 DIAGNOSIS — Z88.7 ALLERGY STATUS TO SERUM AND VACCINE: ICD-10-CM

## 2024-11-26 DIAGNOSIS — E11.52 TYPE 2 DIABETES MELLITUS WITH DIABETIC PERIPHERAL ANGIOPATHY WITH GANGRENE: ICD-10-CM

## 2024-11-26 DIAGNOSIS — Z87.891 PERSONAL HISTORY OF NICOTINE DEPENDENCE: ICD-10-CM

## 2024-11-26 DIAGNOSIS — Z88.0 ALLERGY STATUS TO PENICILLIN: ICD-10-CM

## 2024-11-26 DIAGNOSIS — E87.5 HYPERKALEMIA: ICD-10-CM

## 2024-11-26 DIAGNOSIS — I25.10 ATHEROSCLEROTIC HEART DISEASE OF NATIVE CORONARY ARTERY WITHOUT ANGINA PECTORIS: ICD-10-CM

## 2024-11-26 DIAGNOSIS — E11.10 TYPE 2 DIABETES MELLITUS WITH KETOACIDOSIS WITHOUT COMA: ICD-10-CM

## 2024-11-26 DIAGNOSIS — Z95.1 PRESENCE OF AORTOCORONARY BYPASS GRAFT: ICD-10-CM

## 2024-11-26 DIAGNOSIS — L03.116 CELLULITIS OF LEFT LOWER LIMB: ICD-10-CM

## 2024-11-26 LAB
ALBUMIN SERPL ELPH-MCNC: 3.5 G/DL — SIGNIFICANT CHANGE UP (ref 3.5–5.2)
ALP SERPL-CCNC: 89 U/L — SIGNIFICANT CHANGE UP (ref 30–115)
ALT FLD-CCNC: 18 U/L — SIGNIFICANT CHANGE UP (ref 0–41)
ANION GAP SERPL CALC-SCNC: 17 MMOL/L — HIGH (ref 7–14)
APTT BLD: 31.4 SEC — SIGNIFICANT CHANGE UP (ref 27–39.2)
AST SERPL-CCNC: 26 U/L — SIGNIFICANT CHANGE UP (ref 0–41)
B-OH-BUTYR SERPL-SCNC: 1.5 MMOL/L — HIGH
BASOPHILS # BLD AUTO: 0.12 K/UL — SIGNIFICANT CHANGE UP (ref 0–0.2)
BASOPHILS NFR BLD AUTO: 0.9 % — SIGNIFICANT CHANGE UP (ref 0–1)
BILIRUB SERPL-MCNC: 1.1 MG/DL — SIGNIFICANT CHANGE UP (ref 0.2–1.2)
BUN SERPL-MCNC: 15 MG/DL — SIGNIFICANT CHANGE UP (ref 10–20)
CALCIUM SERPL-MCNC: 9.3 MG/DL — SIGNIFICANT CHANGE UP (ref 8.4–10.4)
CHLORIDE SERPL-SCNC: 87 MMOL/L — LOW (ref 98–110)
CO2 SERPL-SCNC: 21 MMOL/L — SIGNIFICANT CHANGE UP (ref 17–32)
CREAT SERPL-MCNC: 0.8 MG/DL — SIGNIFICANT CHANGE UP (ref 0.7–1.5)
CRP SERPL-MCNC: 83.2 MG/L — HIGH
EGFR: 96 ML/MIN/1.73M2 — SIGNIFICANT CHANGE UP
EOSINOPHIL # BLD AUTO: 0.03 K/UL — SIGNIFICANT CHANGE UP (ref 0–0.7)
EOSINOPHIL NFR BLD AUTO: 0.2 % — SIGNIFICANT CHANGE UP (ref 0–8)
ERYTHROCYTE [SEDIMENTATION RATE] IN BLOOD: 98 MM/HR — HIGH (ref 0–10)
GAS PNL BLDV: SIGNIFICANT CHANGE UP
GLUCOSE SERPL-MCNC: 430 MG/DL — HIGH (ref 70–99)
HCT VFR BLD CALC: 37.5 % — LOW (ref 42–52)
HGB BLD-MCNC: 12.8 G/DL — LOW (ref 14–18)
IMM GRANULOCYTES NFR BLD AUTO: 1.9 % — HIGH (ref 0.1–0.3)
INR BLD: 1.27 RATIO — SIGNIFICANT CHANGE UP (ref 0.65–1.3)
LYMPHOCYTES # BLD AUTO: 18.8 % — LOW (ref 20.5–51.1)
LYMPHOCYTES # BLD AUTO: 2.64 K/UL — SIGNIFICANT CHANGE UP (ref 1.2–3.4)
MCHC RBC-ENTMCNC: 30.7 PG — SIGNIFICANT CHANGE UP (ref 27–31)
MCHC RBC-ENTMCNC: 34.1 G/DL — SIGNIFICANT CHANGE UP (ref 32–37)
MCV RBC AUTO: 89.9 FL — SIGNIFICANT CHANGE UP (ref 80–94)
MONOCYTES # BLD AUTO: 1.02 K/UL — HIGH (ref 0.1–0.6)
MONOCYTES NFR BLD AUTO: 7.3 % — SIGNIFICANT CHANGE UP (ref 1.7–9.3)
NEUTROPHILS # BLD AUTO: 9.93 K/UL — HIGH (ref 1.4–6.5)
NEUTROPHILS NFR BLD AUTO: 70.9 % — SIGNIFICANT CHANGE UP (ref 42.2–75.2)
NRBC # BLD: 0 /100 WBCS — SIGNIFICANT CHANGE UP (ref 0–0)
PLATELET # BLD AUTO: 240 K/UL — SIGNIFICANT CHANGE UP (ref 130–400)
PMV BLD: 10.6 FL — HIGH (ref 7.4–10.4)
POTASSIUM SERPL-MCNC: 5.4 MMOL/L — HIGH (ref 3.5–5)
POTASSIUM SERPL-SCNC: 5.4 MMOL/L — HIGH (ref 3.5–5)
PROT SERPL-MCNC: 7.3 G/DL — SIGNIFICANT CHANGE UP (ref 6–8)
PROTHROM AB SERPL-ACNC: 15 SEC — HIGH (ref 9.95–12.87)
RBC # BLD: 4.17 M/UL — LOW (ref 4.7–6.1)
RBC # FLD: 13.7 % — SIGNIFICANT CHANGE UP (ref 11.5–14.5)
SODIUM SERPL-SCNC: 125 MMOL/L — LOW (ref 135–146)
WBC # BLD: 14.01 K/UL — HIGH (ref 4.8–10.8)
WBC # FLD AUTO: 14.01 K/UL — HIGH (ref 4.8–10.8)

## 2024-11-26 PROCEDURE — 87075 CULTR BACTERIA EXCEPT BLOOD: CPT

## 2024-11-26 PROCEDURE — 83735 ASSAY OF MAGNESIUM: CPT

## 2024-11-26 PROCEDURE — 36415 COLL VENOUS BLD VENIPUNCTURE: CPT

## 2024-11-26 PROCEDURE — 85018 HEMOGLOBIN: CPT

## 2024-11-26 PROCEDURE — 99223 1ST HOSP IP/OBS HIGH 75: CPT | Mod: AI

## 2024-11-26 PROCEDURE — 80048 BASIC METABOLIC PNL TOTAL CA: CPT

## 2024-11-26 PROCEDURE — 87186 SC STD MICRODIL/AGAR DIL: CPT

## 2024-11-26 PROCEDURE — 85014 HEMATOCRIT: CPT

## 2024-11-26 PROCEDURE — 83036 HEMOGLOBIN GLYCOSYLATED A1C: CPT

## 2024-11-26 PROCEDURE — 87070 CULTURE OTHR SPECIMN AEROBIC: CPT

## 2024-11-26 PROCEDURE — 71046 X-RAY EXAM CHEST 2 VIEWS: CPT

## 2024-11-26 PROCEDURE — 83605 ASSAY OF LACTIC ACID: CPT

## 2024-11-26 PROCEDURE — 88311 DECALCIFY TISSUE: CPT

## 2024-11-26 PROCEDURE — 80053 COMPREHEN METABOLIC PANEL: CPT

## 2024-11-26 PROCEDURE — 99285 EMERGENCY DEPT VISIT HI MDM: CPT | Mod: FS,GC

## 2024-11-26 PROCEDURE — 87077 CULTURE AEROBIC IDENTIFY: CPT

## 2024-11-26 PROCEDURE — 86901 BLOOD TYPING SEROLOGIC RH(D): CPT

## 2024-11-26 PROCEDURE — 73620 X-RAY EXAM OF FOOT: CPT | Mod: 26,LT

## 2024-11-26 PROCEDURE — 82962 GLUCOSE BLOOD TEST: CPT

## 2024-11-26 PROCEDURE — 87641 MR-STAPH DNA AMP PROBE: CPT

## 2024-11-26 PROCEDURE — 93970 EXTREMITY STUDY: CPT

## 2024-11-26 PROCEDURE — 86850 RBC ANTIBODY SCREEN: CPT

## 2024-11-26 PROCEDURE — 93925 LOWER EXTREMITY STUDY: CPT

## 2024-11-26 PROCEDURE — 93010 ELECTROCARDIOGRAM REPORT: CPT

## 2024-11-26 PROCEDURE — 87640 STAPH A DNA AMP PROBE: CPT

## 2024-11-26 PROCEDURE — 88307 TISSUE EXAM BY PATHOLOGIST: CPT

## 2024-11-26 PROCEDURE — 82803 BLOOD GASES ANY COMBINATION: CPT

## 2024-11-26 PROCEDURE — 82330 ASSAY OF CALCIUM: CPT

## 2024-11-26 PROCEDURE — 80202 ASSAY OF VANCOMYCIN: CPT

## 2024-11-26 PROCEDURE — 73720 MRI LWR EXTREMITY W/O&W/DYE: CPT | Mod: MC,LT

## 2024-11-26 PROCEDURE — 84132 ASSAY OF SERUM POTASSIUM: CPT

## 2024-11-26 PROCEDURE — 88304 TISSUE EXAM BY PATHOLOGIST: CPT

## 2024-11-26 PROCEDURE — 84295 ASSAY OF SERUM SODIUM: CPT

## 2024-11-26 PROCEDURE — 87040 BLOOD CULTURE FOR BACTERIA: CPT

## 2024-11-26 PROCEDURE — A9579: CPT

## 2024-11-26 PROCEDURE — 85025 COMPLETE CBC W/AUTO DIFF WBC: CPT

## 2024-11-26 PROCEDURE — 73630 X-RAY EXAM OF FOOT: CPT | Mod: LT

## 2024-11-26 PROCEDURE — 86900 BLOOD TYPING SEROLOGIC ABO: CPT

## 2024-11-26 RX ORDER — 0.9 % SODIUM CHLORIDE 0.9 %
1000 INTRAVENOUS SOLUTION INTRAVENOUS
Refills: 0 | Status: DISCONTINUED | OUTPATIENT
Start: 2024-11-26 | End: 2024-11-29

## 2024-11-26 RX ORDER — ACETAMINOPHEN 500MG 500 MG/1
650 TABLET, COATED ORAL EVERY 6 HOURS
Refills: 0 | Status: DISCONTINUED | OUTPATIENT
Start: 2024-11-26 | End: 2024-11-29

## 2024-11-26 RX ORDER — ENOXAPARIN SODIUM 30 MG/.3ML
40 INJECTION SUBCUTANEOUS EVERY 24 HOURS
Refills: 0 | Status: DISCONTINUED | OUTPATIENT
Start: 2024-11-26 | End: 2024-11-29

## 2024-11-26 RX ORDER — CEFEPIME 2 G/1
2000 INJECTION, POWDER, FOR SOLUTION INTRAVENOUS EVERY 8 HOURS
Refills: 0 | Status: DISCONTINUED | OUTPATIENT
Start: 2024-11-26 | End: 2024-11-29

## 2024-11-26 RX ORDER — SODIUM CHLORIDE 9 MG/ML
1000 INJECTION, SOLUTION INTRAMUSCULAR; INTRAVENOUS; SUBCUTANEOUS
Refills: 0 | Status: DISCONTINUED | OUTPATIENT
Start: 2024-11-26 | End: 2024-11-29

## 2024-11-26 RX ORDER — AZTREONAM 2 G
2000 VIAL (EA) INJECTION ONCE
Refills: 0 | Status: COMPLETED | OUTPATIENT
Start: 2024-11-26 | End: 2024-11-26

## 2024-11-26 RX ORDER — SODIUM CHLORIDE 9 MG/ML
1000 INJECTION, SOLUTION INTRAMUSCULAR; INTRAVENOUS; SUBCUTANEOUS ONCE
Refills: 0 | Status: COMPLETED | OUTPATIENT
Start: 2024-11-26 | End: 2024-11-26

## 2024-11-26 RX ORDER — INSULIN REG, HUM S-S BUFF 100/ML
6 VIAL (ML) INJECTION ONCE
Refills: 0 | Status: COMPLETED | OUTPATIENT
Start: 2024-11-26 | End: 2024-11-26

## 2024-11-26 RX ORDER — ONDANSETRON HYDROCHLORIDE 4 MG/1
4 TABLET, FILM COATED ORAL EVERY 8 HOURS
Refills: 0 | Status: DISCONTINUED | OUTPATIENT
Start: 2024-11-26 | End: 2024-11-29

## 2024-11-26 RX ORDER — SPIRONOLACTONE 25 MG
1 TABLET ORAL
Refills: 0 | DISCHARGE

## 2024-11-26 RX ORDER — SPIRONOLACTONE 25 MG
25 TABLET ORAL DAILY
Refills: 0 | Status: DISCONTINUED | OUTPATIENT
Start: 2024-11-26 | End: 2024-11-26

## 2024-11-26 RX ORDER — INSULIN GLARGINE 100 [IU]/ML
20 INJECTION, SOLUTION SUBCUTANEOUS AT BEDTIME
Refills: 0 | Status: DISCONTINUED | OUTPATIENT
Start: 2024-11-26 | End: 2024-11-29

## 2024-11-26 RX ORDER — GLUCAGON INJECTION, SOLUTION 0.5 MG/.1ML
1 INJECTION, SOLUTION SUBCUTANEOUS ONCE
Refills: 0 | Status: DISCONTINUED | OUTPATIENT
Start: 2024-11-26 | End: 2024-11-29

## 2024-11-26 RX ORDER — ACETAMINOPHEN, DIPHENHYDRAMINE HCL, PHENYLEPHRINE HCL 325; 25; 5 MG/1; MG/1; MG/1
3 TABLET ORAL AT BEDTIME
Refills: 0 | Status: DISCONTINUED | OUTPATIENT
Start: 2024-11-26 | End: 2024-11-29

## 2024-11-26 RX ORDER — SODIUM CHLORIDE 9 MG/ML
1000 INJECTION, SOLUTION INTRAMUSCULAR; INTRAVENOUS; SUBCUTANEOUS
Refills: 0 | Status: DISCONTINUED | OUTPATIENT
Start: 2024-11-26 | End: 2024-11-27

## 2024-11-26 RX ORDER — METRONIDAZOLE 500 MG/1
500 TABLET ORAL ONCE
Refills: 0 | Status: COMPLETED | OUTPATIENT
Start: 2024-11-26 | End: 2024-11-26

## 2024-11-26 RX ORDER — FUROSEMIDE 40 MG/1
20 TABLET ORAL
Refills: 0 | Status: DISCONTINUED | OUTPATIENT
Start: 2024-11-26 | End: 2024-11-26

## 2024-11-26 RX ORDER — FUROSEMIDE 40 MG/1
1 TABLET ORAL
Refills: 0 | DISCHARGE

## 2024-11-26 RX ORDER — MAGNESIUM, ALUMINUM HYDROXIDE 200-225/5
30 SUSPENSION, ORAL (FINAL DOSE FORM) ORAL EVERY 4 HOURS
Refills: 0 | Status: DISCONTINUED | OUTPATIENT
Start: 2024-11-26 | End: 2024-11-29

## 2024-11-26 RX ORDER — METOPROLOL TARTRATE 100 MG/1
50 TABLET, FILM COATED ORAL DAILY
Refills: 0 | Status: DISCONTINUED | OUTPATIENT
Start: 2024-11-26 | End: 2024-11-29

## 2024-11-26 RX ADMIN — METRONIDAZOLE 100 MILLIGRAM(S): 500 TABLET ORAL at 18:28

## 2024-11-26 RX ADMIN — Medication 6 UNIT(S): at 19:58

## 2024-11-26 RX ADMIN — Medication 100 MILLIGRAM(S): at 19:34

## 2024-11-26 RX ADMIN — SODIUM CHLORIDE 1000 MILLILITER(S): 9 INJECTION, SOLUTION INTRAMUSCULAR; INTRAVENOUS; SUBCUTANEOUS at 23:45

## 2024-11-26 NOTE — ED PROVIDER NOTE - ATTENDING CONTRIBUTION TO CARE
68yo man h/o DM sent in by his podiatrist due to infected wound to his left great toe with associated erythema up to the mid lower leg. No constitutional sx, no significant trauma, pt denies pain. VS, exam as noted, pt very well appearing. Foot has dry gangrene on the plantar aspect of the great toe with erythema and lymphangitic streaking up to the knee. No crepitus, no air on XR. Seen by podiatry, rec IV abx, possible OR during this admission. Of note, labs with leukocytosis, hyperglycemia in the 400s with Bhydroxy 1.5. Clinically not DKA, pt pending fluids, VBG for admission.

## 2024-11-26 NOTE — ED PROVIDER NOTE - OBJECTIVE STATEMENT
69-year-old male with past medical history of diabetes, cirrhosis, who presents for left great toe swelling and erythema and discoloration over the past week.  Was evaluated by Dr. Rodríguez of podiatry and sent here for IV ABx and podiatry evaluation.  Denies any known trauma to the area.  Reports normal range of motion to the area.  Denies any fevers, chills, chest pain, shortness of breath, purulent drainage.

## 2024-11-26 NOTE — CONSULT NOTE ADULT - SUBJECTIVE AND OBJECTIVE BOX
Podiatry Consult Note    Subjective:  ROBYN PETTIT  Seen Bedside 69y Male  .   Patient is a 69y old  Male who presents with a chief complaint of   HPI:  69-year-old male PMH diabetes, cirrhosis, CAD s/p CABG 8 years ago who presents for left great toe swelling and erythema and discoloration over the past 6 days.  Was evaluated by Dr. Rodríguez of podiatry and sent here for IV ABx and podiatry evaluation.  Denies any known trauma to the area.  Reports normal range of motion to the area. Has some reduced sensation Denies any fevers, chills, chest pain, shortness of breath, palpitations, cough, congestion, abdominal pain, NVDC, dysuria, purulent drainage, headache. Quit smoking 8 years ago, beforehand about  pack years, drinking beer regularly about 2 a day, no drugs or marijuana.     In the ED, /95, HR 87, afebrile, satting well on RA  BHB 1.5, lactate 3, Glucose 430, pH 7.4, Na 125, ESR 98, CRP 83, K 5.4, Cr 0.8, WBC 14, Hgb 13,   Given lispro, aztreonam and flagyl and admitted to medicine for diabetic foot wound    ALLERGIES:  penicillin (Unknown)  tetanus toxoid (Unknown)    MEDICATIONS  (STANDING):  cefepime   IVPB 2000 milliGRAM(s) IV Intermittent every 8 hours  dextrose 5%. 1000 milliLiter(s) (50 mL/Hr) IV Continuous <Continuous>  dextrose 5%. 1000 milliLiter(s) (100 mL/Hr) IV Continuous <Continuous>  dextrose 50% Injectable 25 Gram(s) IV Push once  dextrose 50% Injectable 12.5 Gram(s) IV Push once  dextrose 50% Injectable 25 Gram(s) IV Push once  enoxaparin Injectable 40 milliGRAM(s) SubCutaneous every 24 hours  furosemide    Tablet 20 milliGRAM(s) Oral <User Schedule>  glucagon  Injectable 1 milliGRAM(s) IntraMuscular once  insulin glargine Injectable (LANTUS) 20 Unit(s) SubCutaneous at bedtime  insulin lispro (ADMELOG) corrective regimen sliding scale   SubCutaneous three times a day before meals  insulin lispro (ADMELOG) corrective regimen sliding scale   SubCutaneous at bedtime  insulin lispro Injectable (ADMELOG) 5 Unit(s) SubCutaneous before breakfast  insulin lispro Injectable (ADMELOG) 7 Unit(s) SubCutaneous three times a day before meals  metoprolol succinate ER 50 milliGRAM(s) Oral daily  sodium chloride 0.9% Bolus 1000 milliLiter(s) IV Bolus once  sodium chloride 0.9%. 1000 milliLiter(s) (75 mL/Hr) IV Continuous <Continuous>  sodium chloride 0.9%. 1000 milliLiter(s) (250 mL/Hr) IV Continuous <Continuous>    MEDICATIONS  (PRN):  acetaminophen     Tablet .. 650 milliGRAM(s) Oral every 6 hours PRN Temp greater or equal to 38C (100.4F), Mild Pain (1 - 3)  aluminum hydroxide/magnesium hydroxide/simethicone Suspension 30 milliLiter(s) Oral every 4 hours PRN Dyspepsia  dextrose Oral Gel 15 Gram(s) Oral once PRN Blood Glucose LESS THAN 70 milliGRAM(s)/deciliter  melatonin 3 milliGRAM(s) Oral at bedtime PRN Insomnia  ondansetron Injectable 4 milliGRAM(s) IV Push every 8 hours PRN Nausea and/or Vomiting    Vital Signs Last 24 Hrs  T(F): 98.1 (26 Nov 2024 23:29), Max: 98.1 (26 Nov 2024 23:29)  HR: 93 (26 Nov 2024 23:29) (87 - 93)  BP: 127/79 (26 Nov 2024 23:29) (127/79 - 143/95)  RR: 18 (26 Nov 2024 23:29) (17 - 18)  SpO2: 98% (26 Nov 2024 23:29) (97% - 98%)  I&O's Summary    PHYSICAL EXAM:  General: NAD, A/O x 3  ENT: Moist mucous membranes, no thrush  Neck: Supple, No JVD  Lungs: Clear to auscultation bilaterally, good air entry, non-labored breathing  Cardio: RRR, S1/S2, No murmur  Abdomen: Soft, Nontender, Nondistended; Bowel sounds present  Extremities: No calf tenderness, No pitting edema, R Foot has reduced sensation, good perfusion, pulses present, warm and erythematous, Big toe is erythematous on top and black on bottom, some pus noted, no active drainage    LABS:                        12.8   14.01 )-----------( 240      ( 26 Nov 2024 17:57 )             37.5     11-26    125  |  87  |  15  ----------------------------<  430  5.4   |  21  |  0.8    Ca    9.3      26 Nov 2024 17:57    TPro  7.3  /  Alb  3.5  /  TBili  1.1  /  DBili  x   /  AST  26  /  ALT  18  /  AlkPhos  89  11-26    PT/INR - ( 26 Nov 2024 17:57 )   PT: 15.00 sec;   INR: 1.27 ratio         PTT - ( 26 Nov 2024 17:57 )  PTT:31.4 sec    22:58 - VBG - pH: 7.40  | pCO2: 41    | pO2: 52    | Lactate: 3.0      POCT Blood Glucose.: 328 mg/dL (26 Nov 2024 21:35)  POCT Blood Glucose.: 400 mg/dL (26 Nov 2024 19:37)      Urinalysis Basic - ( 26 Nov 2024 17:57 )    Color: x / Appearance: x / SG: x / pH: x  Gluc: 430 mg/dL / Ketone: x  / Bili: x / Urobili: x   Blood: x / Protein: x / Nitrite: x   Leuk Esterase: x / RBC: x / WBC x   Sq Epi: x / Non Sq Epi: x / Bacteria: x     (26 Nov 2024 23:04)      Past Medical History and Surgical History  PAST MEDICAL & SURGICAL HISTORY:  HTN (hypertension)      Diabetic on diet only      Cirrhosis      CAD (coronary artery disease)      Anemia of chronic disorder      Status post cardiac revascularization with bypass aortocoronary anastomosis of five coronary vessels  double      History of cholecystectomy           Review of Systems:  [X] Ten point review of systems is otherwise negative except as noted     Objective:  Vital Signs Last 24 Hrs  T(C): 36.7 (26 Nov 2024 23:29), Max: 36.7 (26 Nov 2024 23:29)  T(F): 98.1 (26 Nov 2024 23:29), Max: 98.1 (26 Nov 2024 23:29)  HR: 93 (26 Nov 2024 23:29) (87 - 93)  BP: 127/79 (26 Nov 2024 23:29) (127/79 - 143/95)  BP(mean): 97 (26 Nov 2024 20:17) (97 - 97)  RR: 18 (26 Nov 2024 23:29) (17 - 18)  SpO2: 98% (26 Nov 2024 23:29) (97% - 98%)    Parameters below as of 26 Nov 2024 23:29  Patient On (Oxygen Delivery Method): room air                            12.8   14.01 )-----------( 240      ( 26 Nov 2024 17:57 )             37.5                 11-26    125[L]  |  87[L]  |  15  ----------------------------<  430[H]  5.4[H]   |  21  |  0.8    Ca    9.3      26 Nov 2024 17:57    TPro  7.3  /  Alb  3.5  /  TBili  1.1  /  DBili  x   /  AST  26  /  ALT  18  /  AlkPhos  89  11-26        Physical Exam Left Lower Extremity Focused:   Derm: Calor, erythema and edema to the Left hallux with a stable eschar cap at the plantar aspect of the hallux. Erythema extending dorsally towards the dorsum of the midfoot. Interdigital maceration present in the interspace. No open wounds observed. No discharge. No malodor. No portal of entry observed.  Vascular: DP and PT Pulses Diminished; Foot is Warm to the touch; Capillary Refill Time < 3 Seconds;    Neuro: Protective Sensation Diminished / Moderately Neuropathic   MSK: Pain On Palpation at Wound Site     Left foot XR: No acute displaced fracture. Moderate midfoot degenerative changes.   Vascular calcifications. No radiographic evidence of subcutaneous   emphysema or osseous erosions.    Assessment:  Infected hallux left foot    Plan:  Chart reviewed and Patient evaluated. All Questions and Concerns Addressed and Answered  XR Imaging Left Foot; reviewed  Local Wound Care;  Wound dressed with betadine DSD  Weight Bearing Status; WBAT  Recommend admission to medicine for IV abx.  ID consult would be appreciated.  Possible Surgical Debridement; Pending Arterial Duplex, ESR/CRP and XR Imaging results.  Recommend MRI to r/o early signs of OM  Discussed Plan w/ Dr. Rodríguez    Podiatry   Please message on teams for further questions

## 2024-11-26 NOTE — H&P ADULT - ATTENDING COMMENTS
patient seen and examined , agree with pgy  assesment and plan except as indicated above,  all imaging and ekg reviewed independantly   69y male presented with left foot pain for one week with no alleviating or aggravating factors     GEN Lying in no acute distress  HEENT Pupils equal and reactive to light and accommodationSupple Neck  PULM Clear to auscultation bilaterally  CV s1s2 regular rate and rhythm  GI + bowel sounds nontnender  EXT  L foot wrapped   PSYCH awake alert and oriented x 3  INTEG No Lesions  NEURO Moves all extremities    # Left diabetic foot ulcer    no intervention by podiatry ,    id eval   MRI pending  Duplex performed awaiting report    #Hypomagnesemia replete     # Pseudohyponatremia correct glucose     # DM with mild DKA ,  closed AG   POCT Blood Glucose.: 328 mg/dL (26 Nov 2024 21:35)  POCT Blood Glucose.: 400 mg/dL (26 Nov 2024 19:37)  lantus 30 units and lispro 10 units ac     #Overweight BMI 28 patient needs to see dieitian outpatient for further evaluation     PROGRESS NOTE HANDOFF    Pending:  MRI and ID eval     Family discussion: patient verbalized understanding and agreeable to plan of care     Disposition: Home

## 2024-11-26 NOTE — ED PROVIDER NOTE - PROGRESS NOTE DETAILS
Tito Farley DO: Podiatry evaluated patient.  Recommend admission to medicine and will follow up.  Possible intervention from podiatry.  ID and vascular consult. received sign out, will f/u pending labs and admit to medicine

## 2024-11-26 NOTE — H&P ADULT - HISTORY OF PRESENT ILLNESS
69-year-old male PMH diabetes, cirrhosis, CAD s/p CABG 8 years ago who presents for left great toe swelling and erythema and discoloration over the past 6 days.  Was evaluated by Dr. Rodríguez of podiatry and sent here for IV ABx and podiatry evaluation.  Denies any known trauma to the area.  Reports normal range of motion to the area. Has some reduced sensation Denies any fevers, chills, chest pain, shortness of breath, palpitations, cough, congestion, abdominal pain, NVDC, dysuria, purulent drainage, headache. Quit smoking 8 years ago, beforehand about  pack years, drinking beer regularly about 2 a day, no drugs or marijuana.     In the ED, /95, HR 87, afebrile, satting well on RA  BHB 1.5, lactate 3, Glucose 430, pH 7.4, Na 125, ESR 98, CRP 83, K 5.4, Cr 0.8, WBC 14, Hgb 13,   Given lispro, aztreonam and flagyl and admitted to medicine for diabetic foot wound    ALLERGIES:  penicillin (Unknown)  tetanus toxoid (Unknown)    MEDICATIONS  (STANDING):  cefepime   IVPB 2000 milliGRAM(s) IV Intermittent every 8 hours  dextrose 5%. 1000 milliLiter(s) (50 mL/Hr) IV Continuous <Continuous>  dextrose 5%. 1000 milliLiter(s) (100 mL/Hr) IV Continuous <Continuous>  dextrose 50% Injectable 25 Gram(s) IV Push once  dextrose 50% Injectable 12.5 Gram(s) IV Push once  dextrose 50% Injectable 25 Gram(s) IV Push once  enoxaparin Injectable 40 milliGRAM(s) SubCutaneous every 24 hours  furosemide    Tablet 20 milliGRAM(s) Oral <User Schedule>  glucagon  Injectable 1 milliGRAM(s) IntraMuscular once  insulin glargine Injectable (LANTUS) 20 Unit(s) SubCutaneous at bedtime  insulin lispro (ADMELOG) corrective regimen sliding scale   SubCutaneous three times a day before meals  insulin lispro (ADMELOG) corrective regimen sliding scale   SubCutaneous at bedtime  insulin lispro Injectable (ADMELOG) 5 Unit(s) SubCutaneous before breakfast  insulin lispro Injectable (ADMELOG) 7 Unit(s) SubCutaneous three times a day before meals  metoprolol succinate ER 50 milliGRAM(s) Oral daily  sodium chloride 0.9% Bolus 1000 milliLiter(s) IV Bolus once  sodium chloride 0.9%. 1000 milliLiter(s) (75 mL/Hr) IV Continuous <Continuous>  sodium chloride 0.9%. 1000 milliLiter(s) (250 mL/Hr) IV Continuous <Continuous>    MEDICATIONS  (PRN):  acetaminophen     Tablet .. 650 milliGRAM(s) Oral every 6 hours PRN Temp greater or equal to 38C (100.4F), Mild Pain (1 - 3)  aluminum hydroxide/magnesium hydroxide/simethicone Suspension 30 milliLiter(s) Oral every 4 hours PRN Dyspepsia  dextrose Oral Gel 15 Gram(s) Oral once PRN Blood Glucose LESS THAN 70 milliGRAM(s)/deciliter  melatonin 3 milliGRAM(s) Oral at bedtime PRN Insomnia  ondansetron Injectable 4 milliGRAM(s) IV Push every 8 hours PRN Nausea and/or Vomiting    Vital Signs Last 24 Hrs  T(F): 98.1 (26 Nov 2024 23:29), Max: 98.1 (26 Nov 2024 23:29)  HR: 93 (26 Nov 2024 23:29) (87 - 93)  BP: 127/79 (26 Nov 2024 23:29) (127/79 - 143/95)  RR: 18 (26 Nov 2024 23:29) (17 - 18)  SpO2: 98% (26 Nov 2024 23:29) (97% - 98%)  I&O's Summary    PHYSICAL EXAM:  General: NAD, A/O x 3  ENT: Moist mucous membranes, no thrush  Neck: Supple, No JVD  Lungs: Clear to auscultation bilaterally, good air entry, non-labored breathing  Cardio: RRR, S1/S2, No murmur  Abdomen: Soft, Nontender, Nondistended; Bowel sounds present  Extremities: No calf tenderness, No pitting edema, R Foot has reduced sensation, good perfusion, pulses present, warm and erythematous, Big toe is erythematous on top and black on bottom, some pus noted, no active drainage    LABS:                        12.8   14.01 )-----------( 240      ( 26 Nov 2024 17:57 )             37.5     11-26    125  |  87  |  15  ----------------------------<  430  5.4   |  21  |  0.8    Ca    9.3      26 Nov 2024 17:57    TPro  7.3  /  Alb  3.5  /  TBili  1.1  /  DBili  x   /  AST  26  /  ALT  18  /  AlkPhos  89  11-26    PT/INR - ( 26 Nov 2024 17:57 )   PT: 15.00 sec;   INR: 1.27 ratio         PTT - ( 26 Nov 2024 17:57 )  PTT:31.4 sec    22:58 - VBG - pH: 7.40  | pCO2: 41    | pO2: 52    | Lactate: 3.0      POCT Blood Glucose.: 328 mg/dL (26 Nov 2024 21:35)  POCT Blood Glucose.: 400 mg/dL (26 Nov 2024 19:37)      Urinalysis Basic - ( 26 Nov 2024 17:57 )    Color: x / Appearance: x / SG: x / pH: x  Gluc: 430 mg/dL / Ketone: x  / Bili: x / Urobili: x   Blood: x / Protein: x / Nitrite: x   Leuk Esterase: x / RBC: x / WBC x   Sq Epi: x / Non Sq Epi: x / Bacteria: x

## 2024-11-26 NOTE — ED PROVIDER NOTE - PHYSICAL EXAMINATION
Initial vital signs reviewed.  General: NAD, nontoxic appearing.  HENT: AT/NC.  Eyes: non-injected conjunctivae b/l.  Neck: supple.  CV: RRR, no murmurs. 2+ distal pulses x4.  Pulm: nonlabored work of breathing, CTAB.  Abd: soft, nondistended, nontender.  MSK: no joint deformity. Erythematous, edematous great toe with proximal pitting edema.  Palmar aspect of great toe with black discoloration.  No purulent drainage.  Skin: warm, dry, well-perfused.  Neuro: A&Ox4.  Psych: appropriate mood and affect.

## 2024-11-26 NOTE — ED PROVIDER NOTE - CLINICAL SUMMARY MEDICAL DECISION MAKING FREE TEXT BOX
69-year-old man, history of diabetes and hypertension sent by podiatry for gangrenous skin changes to the great toe pad with swelling and erythema extending from the toe up to the mid lower leg consistent with DFU with cellulitis.  Vital signs, exam as noted, patient is very well-appearing, but the foot has necrotic changes to the toe as above, no active drainage, no crepitus.  No free air on XR.  Labs with leukocytosis, hyperglycemia, borderline DKA but as pt so well appearing/asymptomatic, Insulin and fluids given, IV antibiotics given, patient stable for floor.

## 2024-11-26 NOTE — H&P ADULT - ASSESSMENT
69-year-old male PMH diabetes, cirrhosis, CAD s/p CABG 8 years ago who presents for left great toe swelling and erythema and discoloration over the past 6 days.  Was evaluated by Dr. Rodríguez of podiatry and sent here for IV ABx and podiatry evaluation.  Denies any known trauma to the area.  Reports normal range of motion to the area. Has some reduced sensation Denies any fevers, chills, chest pain, shortness of breath, palpitations, cough, congestion, abdominal pain, NVDC, dysuria, purulent drainage, headache. Quit smoking 8 years ago, beforehand about  pack years, drinking beer regularly about 2 a day, no drugs or marijuana.     In the ED, /95, HR 87, afebrile, satting well on RA  BHB 1.5, lactate 3, Glucose 430, pH 7.4, Na 125, ESR 98, CRP 83, K 5.4, Cr 0.8, WBC 14, Hgb 13,   Given lispro, aztreonam and flagyl and admitted to medicine for diabetic foot wound    #DM on metformin- likely uncontrolled/early DKA  #Immuncompromised due to DM  #Mild sepsis due to DM associated foot wound with cellulitis of the foot  #Hyponatremia, Hyperkalemia but associated with hyperglycemia  #Liver Cirrhosis  -Give lispro 5 units, start lantus 20, lispro 7 TID with ISS, A1C in AM, endocrine consult   -NS 1 L and then 75 cc/hr, if dry can get more  -Repeat BMP at 2330 and AM  -monitor FS  -blood cultures  -cefepime  -MRSA  -ID and podiatry consults- may need debridement  -c/w lasix 20 MWF, toprol 50, can hold aldactone 25 for now in setting of hyperkalemia- resume when corrected K, holding metformin 500 BID  -Duplex lower extremities- assess for arterial disease    #DVT PPX- lovenox  #Diet- pending podiatry eval  #Dispo- med/surg  #Pending- podiatry, ID, endo, abx, insulin 69-year-old male PMH diabetes, cirrhosis, CAD s/p CABG 8 years ago who presents for left great toe swelling and erythema and discoloration over the past 6 days.  Was evaluated by Dr. Rodríguez of podiatry and sent here for IV ABx and podiatry evaluation.  Denies any known trauma to the area.  Reports normal range of motion to the area. Has some reduced sensation Denies any fevers, chills, chest pain, shortness of breath, palpitations, cough, congestion, abdominal pain, NVDC, dysuria, purulent drainage, headache. Quit smoking 8 years ago, beforehand about  pack years, drinking beer regularly about 2 a day, no drugs or marijuana.     In the ED, /95, HR 87, afebrile, satting well on RA  BHB 1.5, lactate 3, Glucose 430, pH 7.4, Na 125, ESR 98, CRP 83, K 5.4, Cr 0.8, WBC 14, Hgb 13,   Given lispro, aztreonam and flagyl and admitted to medicine for diabetic foot wound    #DM on metformin- likely uncontrolled/early DKA  #Immuncompromised due to DM  #Mild sepsis due to DM associated foot wound with cellulitis of the foot  #Hyponatremia, Hyperkalemia but associated with hyperglycemia  #Liver Cirrhosis  -Give lispro 5 units, start lantus 20, lispro 7 TID with ISS, A1C in AM, endocrine consult   -NS 1 L and then 75 cc/hr, if dry can get more  -Repeat BMP at 2330 and AM  -monitor FS  -blood cultures  -cefepime  -MRSA  -ID and podiatry consults- may need debridement  -hold lasix 20 MWF, c/w toprol 50, hold aldactone 25 for now in setting of hyperkalemia- resume when corrected K if sepsis is improved, holding metformin 500 BID  -Duplex lower extremities- assess for arterial disease    #DVT PPX- lovenox  #Diet- pending podiatry eval  #Dispo- med/surg  #Pending- podiatry, ID, endo, abx, insulin

## 2024-11-27 LAB
A1C WITH ESTIMATED AVERAGE GLUCOSE RESULT: >15.5 % — HIGH (ref 4–5.6)
ANION GAP SERPL CALC-SCNC: 12 MMOL/L — SIGNIFICANT CHANGE UP (ref 7–14)
ANION GAP SERPL CALC-SCNC: 13 MMOL/L — SIGNIFICANT CHANGE UP (ref 7–14)
BASOPHILS # BLD AUTO: 0.07 K/UL — SIGNIFICANT CHANGE UP (ref 0–0.2)
BASOPHILS NFR BLD AUTO: 0.6 % — SIGNIFICANT CHANGE UP (ref 0–1)
BUN SERPL-MCNC: 15 MG/DL — SIGNIFICANT CHANGE UP (ref 10–20)
BUN SERPL-MCNC: 18 MG/DL — SIGNIFICANT CHANGE UP (ref 10–20)
CALCIUM SERPL-MCNC: 9.3 MG/DL — SIGNIFICANT CHANGE UP (ref 8.4–10.5)
CALCIUM SERPL-MCNC: 9.4 MG/DL — SIGNIFICANT CHANGE UP (ref 8.4–10.5)
CHLORIDE SERPL-SCNC: 87 MMOL/L — LOW (ref 98–110)
CHLORIDE SERPL-SCNC: 88 MMOL/L — LOW (ref 98–110)
CO2 SERPL-SCNC: 25 MMOL/L — SIGNIFICANT CHANGE UP (ref 17–32)
CO2 SERPL-SCNC: 27 MMOL/L — SIGNIFICANT CHANGE UP (ref 17–32)
CREAT SERPL-MCNC: 0.7 MG/DL — SIGNIFICANT CHANGE UP (ref 0.7–1.5)
CREAT SERPL-MCNC: 0.8 MG/DL — SIGNIFICANT CHANGE UP (ref 0.7–1.5)
EGFR: 100 ML/MIN/1.73M2 — SIGNIFICANT CHANGE UP
EGFR: 96 ML/MIN/1.73M2 — SIGNIFICANT CHANGE UP
EOSINOPHIL # BLD AUTO: 0.1 K/UL — SIGNIFICANT CHANGE UP (ref 0–0.7)
EOSINOPHIL NFR BLD AUTO: 0.8 % — SIGNIFICANT CHANGE UP (ref 0–8)
ESTIMATED AVERAGE GLUCOSE: >398 MG/DL — HIGH (ref 68–114)
GLUCOSE BLDC GLUCOMTR-MCNC: 229 MG/DL — HIGH (ref 70–99)
GLUCOSE BLDC GLUCOMTR-MCNC: 391 MG/DL — HIGH (ref 70–99)
GLUCOSE SERPL-MCNC: 340 MG/DL — HIGH (ref 70–99)
GLUCOSE SERPL-MCNC: 374 MG/DL — HIGH (ref 70–99)
HCT VFR BLD CALC: 36.6 % — LOW (ref 42–52)
HGB BLD-MCNC: 12 G/DL — LOW (ref 14–18)
IMM GRANULOCYTES NFR BLD AUTO: 1.4 % — HIGH (ref 0.1–0.3)
LACTATE SERPL-SCNC: 1.6 MMOL/L — SIGNIFICANT CHANGE UP (ref 0.7–2)
LYMPHOCYTES # BLD AUTO: 1.91 K/UL — SIGNIFICANT CHANGE UP (ref 1.2–3.4)
LYMPHOCYTES # BLD AUTO: 15.8 % — LOW (ref 20.5–51.1)
MAGNESIUM SERPL-MCNC: 1.6 MG/DL — LOW (ref 1.8–2.4)
MCHC RBC-ENTMCNC: 29.9 PG — SIGNIFICANT CHANGE UP (ref 27–31)
MCHC RBC-ENTMCNC: 32.8 G/DL — SIGNIFICANT CHANGE UP (ref 32–37)
MCV RBC AUTO: 91.3 FL — SIGNIFICANT CHANGE UP (ref 80–94)
MONOCYTES # BLD AUTO: 1.26 K/UL — HIGH (ref 0.1–0.6)
MONOCYTES NFR BLD AUTO: 10.4 % — HIGH (ref 1.7–9.3)
NEUTROPHILS # BLD AUTO: 8.61 K/UL — HIGH (ref 1.4–6.5)
NEUTROPHILS NFR BLD AUTO: 71 % — SIGNIFICANT CHANGE UP (ref 42.2–75.2)
NRBC # BLD: 0 /100 WBCS — SIGNIFICANT CHANGE UP (ref 0–0)
PLATELET # BLD AUTO: 208 K/UL — SIGNIFICANT CHANGE UP (ref 130–400)
PMV BLD: 11 FL — HIGH (ref 7.4–10.4)
POTASSIUM SERPL-MCNC: 4.9 MMOL/L — SIGNIFICANT CHANGE UP (ref 3.5–5)
POTASSIUM SERPL-MCNC: 5 MMOL/L — SIGNIFICANT CHANGE UP (ref 3.5–5)
POTASSIUM SERPL-SCNC: 4.9 MMOL/L — SIGNIFICANT CHANGE UP (ref 3.5–5)
POTASSIUM SERPL-SCNC: 5 MMOL/L — SIGNIFICANT CHANGE UP (ref 3.5–5)
RBC # BLD: 4.01 M/UL — LOW (ref 4.7–6.1)
RBC # FLD: 13.9 % — SIGNIFICANT CHANGE UP (ref 11.5–14.5)
SODIUM SERPL-SCNC: 126 MMOL/L — LOW (ref 135–146)
SODIUM SERPL-SCNC: 126 MMOL/L — LOW (ref 135–146)
WBC # BLD: 12.12 K/UL — HIGH (ref 4.8–10.8)
WBC # FLD AUTO: 12.12 K/UL — HIGH (ref 4.8–10.8)

## 2024-11-27 PROCEDURE — 93970 EXTREMITY STUDY: CPT | Mod: 26

## 2024-11-27 PROCEDURE — 93925 LOWER EXTREMITY STUDY: CPT | Mod: 26

## 2024-11-27 PROCEDURE — 99223 1ST HOSP IP/OBS HIGH 75: CPT | Mod: AI

## 2024-11-27 PROCEDURE — 73720 MRI LWR EXTREMITY W/O&W/DYE: CPT | Mod: 26,LT

## 2024-11-27 RX ORDER — METRONIDAZOLE 500 MG/1
500 TABLET ORAL
Refills: 0 | Status: DISCONTINUED | OUTPATIENT
Start: 2024-11-27 | End: 2024-11-29

## 2024-11-27 RX ORDER — VANCOMYCIN HCL 900 MCG/MG
1500 POWDER (GRAM) MISCELLANEOUS EVERY 12 HOURS
Refills: 0 | Status: DISCONTINUED | OUTPATIENT
Start: 2024-11-27 | End: 2024-11-29

## 2024-11-27 RX ADMIN — Medication 300 MILLIGRAM(S): at 16:44

## 2024-11-27 RX ADMIN — METRONIDAZOLE 500 MILLIGRAM(S): 500 TABLET ORAL at 16:02

## 2024-11-27 RX ADMIN — Medication 10: at 16:54

## 2024-11-27 RX ADMIN — CEFEPIME 100 MILLIGRAM(S): 2 INJECTION, POWDER, FOR SOLUTION INTRAVENOUS at 21:15

## 2024-11-27 RX ADMIN — SODIUM CHLORIDE 75 MILLILITER(S): 9 INJECTION, SOLUTION INTRAMUSCULAR; INTRAVENOUS; SUBCUTANEOUS at 02:34

## 2024-11-27 RX ADMIN — Medication 7 UNIT(S): at 16:54

## 2024-11-27 RX ADMIN — METOPROLOL TARTRATE 50 MILLIGRAM(S): 100 TABLET, FILM COATED ORAL at 05:16

## 2024-11-27 RX ADMIN — ENOXAPARIN SODIUM 40 MILLIGRAM(S): 30 INJECTION SUBCUTANEOUS at 05:16

## 2024-11-27 RX ADMIN — CEFEPIME 100 MILLIGRAM(S): 2 INJECTION, POWDER, FOR SOLUTION INTRAVENOUS at 05:16

## 2024-11-27 RX ADMIN — INSULIN GLARGINE 20 UNIT(S): 100 INJECTION, SOLUTION SUBCUTANEOUS at 21:14

## 2024-11-27 RX ADMIN — CEFEPIME 100 MILLIGRAM(S): 2 INJECTION, POWDER, FOR SOLUTION INTRAVENOUS at 14:35

## 2024-11-27 NOTE — PATIENT PROFILE ADULT - FALL HARM RISK - HARM RISK INTERVENTIONS
Assistance with ambulation/Assistance OOB with selected safe patient handling equipment/Communicate Risk of Fall with Harm to all staff/Discuss with provider need for PT consult/Monitor gait and stability/Reinforce activity limits and safety measures with patient and family/Tailored Fall Risk Interventions/Use of alarms - bed, chair and/or voice tab/Visual Cue: Yellow wristband and red socks/Bed in lowest position, wheels locked, appropriate side rails in place/Call bell, personal items and telephone in reach/Instruct patient to call for assistance before getting out of bed or chair/Non-slip footwear when patient is out of bed/[place] to call system/Physically safe environment - no spills, clutter or unnecessary equipment/Purposeful Proactive Rounding/Room/bathroom lighting operational, light cord in reach

## 2024-11-27 NOTE — PROGRESS NOTE ADULT - SUBJECTIVE AND OBJECTIVE BOX
Podiatry Progress Note    Subjective:  ROBYN PETTIT is a  69y Male who was seen bedside this morning with attending Dr. Rodríguez.  Dressing intact, no strikethrough noted.       Past Medical History and Surgical History  PAST MEDICAL & SURGICAL HISTORY:  HTN (hypertension)      Diabetic on diet only      Cirrhosis      CAD (coronary artery disease)      Anemia of chronic disorder      Status post cardiac revascularization with bypass aortocoronary anastomosis of five coronary vessels  double      History of cholecystectomy           Objective:  Vital Signs Last 24 Hrs  T(C): 36.8 (27 Nov 2024 01:22), Max: 36.8 (27 Nov 2024 01:22)  T(F): 98.2 (27 Nov 2024 01:22), Max: 98.2 (27 Nov 2024 01:22)  HR: 81 (27 Nov 2024 05:00) (81 - 96)  BP: 146/88 (27 Nov 2024 05:00) (127/79 - 159/93)  BP(mean): 97 (26 Nov 2024 20:17) (97 - 97)  RR: 18 (27 Nov 2024 01:22) (17 - 18)  SpO2: 97% (27 Nov 2024 01:22) (97% - 98%)    Parameters below as of 27 Nov 2024 01:22  Patient On (Oxygen Delivery Method): room air                            12.0   12.12 )-----------( 208      ( 27 Nov 2024 05:30 )             36.6                 11-27    126[L]  |  87[L]  |  18  ----------------------------<  340[H]  4.9   |  27  |  0.8    Ca    9.3      27 Nov 2024 05:30  Mg     1.6     11-27    TPro  7.3  /  Alb  3.5  /  TBili  1.1  /  DBili  x   /  AST  26  /  ALT  18  /  AlkPhos  89  11-26      Physical Exam Left Lower Extremity Focused:   Derm:  -Ulceration noted to plantar hallux with overlying necrotic cap, bogginess noted plantarly with purulent drainage.   -Erythema and edema noted throughout the left foot to the level of the ankle.   Vascular: DP and PT Pulses Diminished; Foot is Warm to the touch; Capillary Refill Time < 3 Seconds;    Neuro: Protective Sensation Diminished / Moderately Neuropathic   MSK: Minimal Pain On Palpation at Wound Site     Left foot XR:   No acute displaced fracture. Moderate midfoot degenerative changes.   Vascular calcifications. No radiographic evidence of subcutaneous   emphysema or osseous erosions.    Assessment:  Infected hallux left foot    Plan:  Chart reviewed and Patient evaluated. All Questions and Concerns Addressed and Answered  XR Imaging Left Foot; reviewed and stated above  MRI LF; Pending results  Arterial duplex; Pending final read.   Request Vascular consult   Local Wound Care;  Wound dressed with betadine DSD  Weight Bearing Status; WBAT  Appreciate ID recs  Possible Surgical Debridement; Pending duplexes, and MRI results  Patient seen and evaluated with Dr. Rodríguez

## 2024-11-27 NOTE — PROGRESS NOTE ADULT - SUBJECTIVE AND OBJECTIVE BOX
ROBYN PETTIT 69y Male  MRN#: [id number]     Hospital Day: 1d    SUBJECTIVE     No overnight events. Patient seen and evaluated at bedside reports feeling well, and has no acute complaints.                                             ----------------------------------------------------------  OBJECTIVE  PAST MEDICAL & SURGICAL HISTORY  HTN (hypertension)    Diabetic on diet only    Cirrhosis    CAD (coronary artery disease)    Anemia of chronic disorder    Status post cardiac revascularization with bypass aortocoronary anastomosis of five coronary vessels  double    History of cholecystectomy                                              -----------------------------------------------------------  ALLERGIES:  penicillin (Unknown)  tetanus toxoid (Unknown)                                            ------------------------------------------------------------    HOME MEDICATIONS  Home Medications:  furosemide 20 mg oral tablet: 1 tab(s) orally 3 times a week Monday, Wednesday, Friday (26 Nov 2024 23:38)  MetFORMIN (Eqv-Fortamet) 500 mg oral tablet, extended release: 1 tab(s) orally 2 times a day (26 Nov 2024 22:55)  spironolactone 25 mg oral tablet: 1 tab(s) orally once a day (26 Nov 2024 23:38)  Toprol-XL 50 mg oral tablet, extended release: 1 tab(s) orally (26 Nov 2024 22:57)                           MEDICATIONS:  STANDING MEDICATIONS  cefepime   IVPB 2000 milliGRAM(s) IV Intermittent every 8 hours  dextrose 5%. 1000 milliLiter(s) IV Continuous <Continuous>  dextrose 5%. 1000 milliLiter(s) IV Continuous <Continuous>  dextrose 50% Injectable 25 Gram(s) IV Push once  dextrose 50% Injectable 12.5 Gram(s) IV Push once  dextrose 50% Injectable 25 Gram(s) IV Push once  enoxaparin Injectable 40 milliGRAM(s) SubCutaneous every 24 hours  glucagon  Injectable 1 milliGRAM(s) IntraMuscular once  insulin glargine Injectable (LANTUS) 20 Unit(s) SubCutaneous at bedtime  insulin lispro (ADMELOG) corrective regimen sliding scale   SubCutaneous three times a day before meals  insulin lispro (ADMELOG) corrective regimen sliding scale   SubCutaneous at bedtime  insulin lispro Injectable (ADMELOG) 5 Unit(s) SubCutaneous before breakfast  insulin lispro Injectable (ADMELOG) 7 Unit(s) SubCutaneous three times a day before meals  magnesium sulfate  IVPB 2 Gram(s) IV Intermittent once  metoprolol succinate ER 50 milliGRAM(s) Oral daily  sodium chloride 0.9%. 1000 milliLiter(s) IV Continuous <Continuous>    PRN MEDICATIONS  acetaminophen     Tablet .. 650 milliGRAM(s) Oral every 6 hours PRN  aluminum hydroxide/magnesium hydroxide/simethicone Suspension 30 milliLiter(s) Oral every 4 hours PRN  dextrose Oral Gel 15 Gram(s) Oral once PRN  melatonin 3 milliGRAM(s) Oral at bedtime PRN  ondansetron Injectable 4 milliGRAM(s) IV Push every 8 hours PRN                                            ------------------------------------------------------------  VITAL SIGNS: Last 24 Hours  T(C): 36.8 (27 Nov 2024 01:22), Max: 36.8 (27 Nov 2024 01:22)  T(F): 98.2 (27 Nov 2024 01:22), Max: 98.2 (27 Nov 2024 01:22)  HR: 81 (27 Nov 2024 05:00) (81 - 96)  BP: 146/88 (27 Nov 2024 05:00) (127/79 - 159/93)  BP(mean): 97 (26 Nov 2024 20:17) (97 - 97)  RR: 18 (27 Nov 2024 01:22) (17 - 18)  SpO2: 97% (27 Nov 2024 01:22) (97% - 98%)      11-26-24 @ 07:01  -  11-27-24 @ 07:00  --------------------------------------------------------  IN: 450 mL / OUT: 0 mL / NET: 450 mL    11-27-24 @ 07:01  -  11-27-24 @ 12:52  --------------------------------------------------------  IN: 350 mL / OUT: 0 mL / NET: 350 mL                                             --------------------------------------------------------------  LABS:                        12.0   12.12 )-----------( 208      ( 27 Nov 2024 05:30 )             36.6     11-27    126[L]  |  87[L]  |  18  ----------------------------<  340[H]  4.9   |  27  |  0.8    Ca    9.3      27 Nov 2024 05:30  Mg     1.6     11-27    TPro  7.3  /  Alb  3.5  /  TBili  1.1  /  DBili  x   /  AST  26  /  ALT  18  /  AlkPhos  89  11-26    PT/INR - ( 26 Nov 2024 17:57 )   PT: 15.00 sec;   INR: 1.27 ratio         PTT - ( 26 Nov 2024 17:57 )  PTT:31.4 sec    Lactate, Blood: 1.6 mmol/L (11-27-24 @ 05:30)  Sedimentation Rate, Erythrocyte: 98 mm/Hr *H* (11-26-24 @ 17:57)          PHYSICAL EXAM:  GENERAL: NAD, lying in bed comfortably  NECK: Supple, No JVD  CHEST/LUNG: Clear to auscultation bilaterally. Unlabored respirations  HEART: regular rate and rhythm; No murmurs  ABDOMEN: Soft, Nontender, Nondistended    EXTREMITIES: Warm. No edema  NERVOUS SYSTEM:  Alert & Oriented X3. No focal deficits                                              --------------------------------------------------------------

## 2024-11-27 NOTE — PHARMACOTHERAPY INTERVENTION NOTE - COMMENTS
Modified penicillin allergy to state patient tolerated cefepime during this admission.    Tommy Long, PharmD, [facility]  Clinical Pharmacy Specialist, Infectious Diseases  Tele-Antimicrobial Stewardship Program (Tele-ASP)  Tele-ASP Phone: [phone]

## 2024-11-27 NOTE — CONSULT NOTE ADULT - SUBJECTIVE AND OBJECTIVE BOX
Pierre arrived from [place]  Called pt to schedule- per RK can be scheduled on a Friday in [facility] as his last appt (may double book a 340 appt)  First available date for this is 12/7  LM for pt to call back to schedule  Minkay will be in [facility] office  HODANROBYN  69y, Male  Allergy: penicillin (Unknown)  tetanus toxoid (Unknown)      CHIEF COMPLAINT:       LOS  1d    HPI  HPI:  69-year-old male PMH diabetes, cirrhosis, CAD s/p CABG 8 years ago who presents for left great toe swelling and erythema and discoloration over the past 6 days.  Was evaluated by Dr. Rodríguze of podiatry and sent here for IV ABx and podiatry evaluation.  Denies any known trauma to the area.  Reports normal range of motion to the area. Has some reduced sensation Denies any fevers, chills, chest pain, shortness of breath, palpitations, cough, congestion, abdominal pain, NVDC, dysuria, purulent drainage, headache. Quit smoking 8 years ago, beforehand about  pack years, drinking beer regularly about 2 a day, no drugs or marijuana.     In the ED, /95, HR 87, afebrile, satting well on RA  BHB 1.5, lactate 3, Glucose 430, pH 7.4, Na 125, ESR 98, CRP 83, K 5.4, Cr 0.8, WBC 14, Hgb 13,   Given lispro, aztreonam and flagyl and admitted to medicine for diabetic foot wound    ALLERGIES:  penicillin (Unknown)  tetanus toxoid (Unknown)      Vital Signs Last 24 Hrs  T(F): 98.1 (26 Nov 2024 23:29), Max: 98.1 (26 Nov 2024 23:29)  HR: 93 (26 Nov 2024 23:29) (87 - 93)  BP: 127/79 (26 Nov 2024 23:29) (127/79 - 143/95)  RR: 18 (26 Nov 2024 23:29) (17 - 18)  SpO2: 98% (26 Nov 2024 23:29) (97% - 98%)  I&O's Summary    PHYSICAL EXAM:  General: NAD, A/O x 3  ENT: Moist mucous membranes, no thrush  Neck: Supple, No JVD  Lungs: Clear to auscultation bilaterally, good air entry, non-labored breathing  Cardio: RRR, S1/S2, No murmur  Abdomen: Soft, Nontender, Nondistended; Bowel sounds present  Extremities: No calf tenderness, No pitting edema, R Foot has reduced sensation, good perfusion, pulses present, warm and erythematous, Big toe is erythematous on top and black on bottom, some pus noted, no active drainage    LABS:                        12.8   14.01 )-----------( 240      ( 26 Nov 2024 17:57 )             37.5          (26 Nov 2024 23:04)      INFECTIOUS DISEASE HISTORY:  ID consulted for DFU  Seen by Podiatry in the ER, exam showing Extremities: No calf tenderness, No pitting edema, R Foot has reduced sensation, good perfusion, pulses present, warm and erythematous, L Big toe is erythematous on top and black on bottom, some pus noted, no active drainage    Currently ordered for:  cefepime   IVPB 2000 milliGRAM(s) IV Intermittent every 8 hours      PMH  PAST MEDICAL & SURGICAL HISTORY:  HTN (hypertension)      Diabetic on diet only      Cirrhosis      CAD (coronary artery disease)      Anemia of chronic disorder      Status post cardiac revascularization with bypass aortocoronary anastomosis of five coronary vessels  double      History of cholecystectomy          FAMILY HISTORY      SOCIAL HISTORY  Social History:        ROS  ***    VITALS:  T(F): 98.2, Max: 98.2 (11-27-24 @ 01:22)  HR: 81  BP: 146/88  RR: 18Vital Signs Last 24 Hrs  T(C): 36.8 (27 Nov 2024 01:22), Max: 36.8 (27 Nov 2024 01:22)  T(F): 98.2 (27 Nov 2024 01:22), Max: 98.2 (27 Nov 2024 01:22)  HR: 81 (27 Nov 2024 05:00) (81 - 96)  BP: 146/88 (27 Nov 2024 05:00) (127/79 - 159/93)  BP(mean): 97 (26 Nov 2024 20:17) (97 - 97)  RR: 18 (27 Nov 2024 01:22) (17 - 18)  SpO2: 97% (27 Nov 2024 01:22) (97% - 98%)    Parameters below as of 27 Nov 2024 01:22  Patient On (Oxygen Delivery Method): room air        PHYSICAL EXAM:  ***    TESTS & MEASUREMENTS:                        12.0   12.12 )-----------( 208      ( 27 Nov 2024 05:30 )             36.6     11-27    126[L]  |  87[L]  |  18  ----------------------------<  340[H]  4.9   |  27  |  0.8    Ca    9.3      27 Nov 2024 05:30  Mg     1.6     11-27    TPro  7.3  /  Alb  3.5  /  TBili  1.1  /  DBili  x   /  AST  26  /  ALT  18  /  AlkPhos  89  11-26      LIVER FUNCTIONS - ( 26 Nov 2024 17:57 )  Alb: 3.5 g/dL / Pro: 7.3 g/dL / ALK PHOS: 89 U/L / ALT: 18 U/L / AST: 26 U/L / GGT: x           Urinalysis Basic - ( 27 Nov 2024 05:30 )    Color: x / Appearance: x / SG: x / pH: x  Gluc: 340 mg/dL / Ketone: x  / Bili: x / Urobili: x   Blood: x / Protein: x / Nitrite: x   Leuk Esterase: x / RBC: x / WBC x   Sq Epi: x / Non Sq Epi: x / Bacteria: x          Lactate, Blood: 1.6 mmol/L (11-27-24 @ 05:30)  Blood Gas Venous - Lactate: 3.0 mmol/L (11-26-24 @ 22:58)      INFECTIOUS DISEASES TESTING      INFLAMMATORY MARKERS  Sedimentation Rate, Erythrocyte: 98 mm/Hr (11-26-24 @ 17:57)      RADIOLOGY & ADDITIONAL TESTS:  I have personally reviewed the last Chest xray  CXR      CT      CARDIOLOGY TESTING       MEDICATIONS  cefepime   IVPB 2000 IV Intermittent every 8 hours  dextrose 5%. 1000 IV Continuous <Continuous>  dextrose 5%. 1000 IV Continuous <Continuous>  dextrose 50% Injectable 25 IV Push once  dextrose 50% Injectable 12.5 IV Push once  dextrose 50% Injectable 25 IV Push once  enoxaparin Injectable 40 SubCutaneous every 24 hours  glucagon  Injectable 1 IntraMuscular once  insulin glargine Injectable (LANTUS) 20 SubCutaneous at bedtime  insulin lispro (ADMELOG) corrective regimen sliding scale  SubCutaneous three times a day before meals  insulin lispro (ADMELOG) corrective regimen sliding scale  SubCutaneous at bedtime  insulin lispro Injectable (ADMELOG) 5 SubCutaneous before breakfast  insulin lispro Injectable (ADMELOG) 7 SubCutaneous three times a day before meals  metoprolol succinate ER 50 Oral daily  sodium chloride 0.9%. 1000 IV Continuous <Continuous>      ANTIBIOTICS:  cefepime   IVPB 2000 milliGRAM(s) IV Intermittent every 8 hours      ALLERGIES:  penicillin (Unknown)  tetanus toxoid (Unknown)         HODANROBYN  69y, Male  Allergy: penicillin (Unknown)  tetanus toxoid (Unknown)      CHIEF COMPLAINT:       LOS  1d    HPI  HPI:  69-year-old male PMH diabetes, cirrhosis, CAD s/p CABG 8 years ago who presents for left great toe swelling and erythema and discoloration over the past 6 days.  Was evaluated by Dr. Rodríguez of podiatry and sent here for IV ABx and podiatry evaluation.  Denies any known trauma to the area.  Reports normal range of motion to the area. Has some reduced sensation Denies any fevers, chills, chest pain, shortness of breath, palpitations, cough, congestion, abdominal pain, NVDC, dysuria, purulent drainage, headache. Quit smoking 8 years ago, beforehand about  pack years, drinking beer regularly about 2 a day, no drugs or marijuana.     In the ED, /95, HR 87, afebrile, satting well on RA  BHB 1.5, lactate 3, Glucose 430, pH 7.4, Na 125, ESR 98, CRP 83, K 5.4, Cr 0.8, WBC 14, Hgb 13,   Given lispro, aztreonam and flagyl and admitted to medicine for diabetic foot wound    ALLERGIES:  penicillin (Unknown)  tetanus toxoid (Unknown)      Vital Signs Last 24 Hrs  T(F): 98.1 (26 Nov 2024 23:29), Max: 98.1 (26 Nov 2024 23:29)  HR: 93 (26 Nov 2024 23:29) (87 - 93)  BP: 127/79 (26 Nov 2024 23:29) (127/79 - 143/95)  RR: 18 (26 Nov 2024 23:29) (17 - 18)  SpO2: 98% (26 Nov 2024 23:29) (97% - 98%)  I&O's Summary    PHYSICAL EXAM:  General: NAD, A/O x 3  ENT: Moist mucous membranes, no thrush  Neck: Supple, No JVD  Lungs: Clear to auscultation bilaterally, good air entry, non-labored breathing  Cardio: RRR, S1/S2, No murmur  Abdomen: Soft, Nontender, Nondistended; Bowel sounds present  Extremities: No calf tenderness, No pitting edema, R Foot has reduced sensation, good perfusion, pulses present, warm and erythematous, Big toe is erythematous on top and black on bottom, some pus noted, no active drainage    LABS:                        12.8   14.01 )-----------( 240      ( 26 Nov 2024 17:57 )             37.5          (26 Nov 2024 23:04)      INFECTIOUS DISEASE HISTORY:  ID consulted for DFU  Seen by Podiatry in the ER, exam showing Extremities: No calf tenderness, No pitting edema, R Foot has reduced sensation, good perfusion, pulses present, warm and erythematous, L Big toe is erythematous on top and black on bottom, some pus noted, no active drainage    Currently ordered for:  cefepime   IVPB 2000 milliGRAM(s) IV Intermittent every 8 hours      PMH  PAST MEDICAL & SURGICAL HISTORY:  HTN (hypertension)      Diabetic on diet only      Cirrhosis      CAD (coronary artery disease)      Anemia of chronic disorder      Status post cardiac revascularization with bypass aortocoronary anastomosis of five coronary vessels  double      History of cholecystectomy          FAMILY HISTORY      SOCIAL HISTORY  Social History:        ROS  General: Denies rigors, nightsweats  HEENT: Denies headache, rhinorrhea, sore throat, eye pain  CV: Denies CP, palpitations  PULM: Denies wheezing, hemoptysis  GI: Denies hematemesis, hematochezia, melena  : Denies discharge, hematuria  MSK: Denies arthralgias, myalgias  SKIN: as noted above   NEURO: Denies paresthesias, weakness  PSYCH: Denies depression, anxiety     VITALS:  T(F): 98.2, Max: 98.2 (11-27-24 @ 01:22)  HR: 81  BP: 146/88  RR: 18Vital Signs Last 24 Hrs  T(C): 36.8 (27 Nov 2024 01:22), Max: 36.8 (27 Nov 2024 01:22)  T(F): 98.2 (27 Nov 2024 01:22), Max: 98.2 (27 Nov 2024 01:22)  HR: 81 (27 Nov 2024 05:00) (81 - 96)  BP: 146/88 (27 Nov 2024 05:00) (127/79 - 159/93)  BP(mean): 97 (26 Nov 2024 20:17) (97 - 97)  RR: 18 (27 Nov 2024 01:22) (17 - 18)  SpO2: 97% (27 Nov 2024 01:22) (97% - 98%)    Parameters below as of 27 Nov 2024 01:22  Patient On (Oxygen Delivery Method): room air        PHYSICAL EXAM:  Gen: NAD, resting in bed  HEENT: Normocephalic, atraumatic  Neck: supple, no lymphadenopathy  CV: Regular rate & regular rhythm  Lungs: decreased BS at bases, no fremitus  Abdomen: Soft, BS present  Ext: Warm, well perfused L great toe erythema/edema and plantar gangrenous changes  Neuro: non focal, awake  Skin: no rash, no erythema  Lines: no phlebitis     TESTS & MEASUREMENTS:                        12.0   12.12 )-----------( 208      ( 27 Nov 2024 05:30 )             36.6     11-27    126[L]  |  87[L]  |  18  ----------------------------<  340[H]  4.9   |  27  |  0.8    Ca    9.3      27 Nov 2024 05:30  Mg     1.6     11-27    TPro  7.3  /  Alb  3.5  /  TBili  1.1  /  DBili  x   /  AST  26  /  ALT  18  /  AlkPhos  89  11-26      LIVER FUNCTIONS - ( 26 Nov 2024 17:57 )  Alb: 3.5 g/dL / Pro: 7.3 g/dL / ALK PHOS: 89 U/L / ALT: 18 U/L / AST: 26 U/L / GGT: x           Urinalysis Basic - ( 27 Nov 2024 05:30 )    Color: x / Appearance: x / SG: x / pH: x  Gluc: 340 mg/dL / Ketone: x  / Bili: x / Urobili: x   Blood: x / Protein: x / Nitrite: x   Leuk Esterase: x / RBC: x / WBC x   Sq Epi: x / Non Sq Epi: x / Bacteria: x          Lactate, Blood: 1.6 mmol/L (11-27-24 @ 05:30)  Blood Gas Venous - Lactate: 3.0 mmol/L (11-26-24 @ 22:58)      INFECTIOUS DISEASES TESTING      INFLAMMATORY MARKERS  Sedimentation Rate, Erythrocyte: 98 mm/Hr (11-26-24 @ 17:57)      RADIOLOGY & ADDITIONAL TESTS:  I have personally reviewed the last Chest xray  CXR      CT      CARDIOLOGY TESTING       MEDICATIONS  cefepime   IVPB 2000 IV Intermittent every 8 hours  dextrose 5%. 1000 IV Continuous <Continuous>  dextrose 5%. 1000 IV Continuous <Continuous>  dextrose 50% Injectable 25 IV Push once  dextrose 50% Injectable 12.5 IV Push once  dextrose 50% Injectable 25 IV Push once  enoxaparin Injectable 40 SubCutaneous every 24 hours  glucagon  Injectable 1 IntraMuscular once  insulin glargine Injectable (LANTUS) 20 SubCutaneous at bedtime  insulin lispro (ADMELOG) corrective regimen sliding scale  SubCutaneous three times a day before meals  insulin lispro (ADMELOG) corrective regimen sliding scale  SubCutaneous at bedtime  insulin lispro Injectable (ADMELOG) 5 SubCutaneous before breakfast  insulin lispro Injectable (ADMELOG) 7 SubCutaneous three times a day before meals  metoprolol succinate ER 50 Oral daily  sodium chloride 0.9%. 1000 IV Continuous <Continuous>      ANTIBIOTICS:  cefepime   IVPB 2000 milliGRAM(s) IV Intermittent every 8 hours      ALLERGIES:  penicillin (Unknown)  tetanus toxoid (Unknown)

## 2024-11-27 NOTE — CONSULT NOTE ADULT - ASSESSMENT
ASSESSMENT  69-year-old male PMH diabetes, cirrhosis, CAD s/p CABG 8 years ago who presents for left great toe swelling and erythema and discoloration over the past 6 days.  Was evaluated by Dr. Rodríguez of podiatry and sent here for IV ABx and podiatry evaluation.     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 All final recommendations to follow after interview and examination of the patient. Please follow recommendations noted below.    If any questions, please send a message or call on [employer] Teams  Please continue to update ID with any pertinent new laboratory, radiographic findings, or change in clinical status   ASSESSMENT  69-year-old male PMH diabetes, cirrhosis, CAD s/p CABG 8 years ago who presents for left great toe swelling and erythema and discoloration over the past 6 days.  Was evaluated by Dr. Rodríguez of podiatry and sent here for IV ABx and podiatry evaluation.     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 Goal trough 15-20. If trough results > 20, please HOLD further Vanco dosing and order AM Random Vanco level   - Appreciate Podiatry consult, doubt cure without aggressive surgery    If any questions, please send a message or call on [employer] Teams  Please continue to update ID with any pertinent new laboratory, radiographic findings, or change in clinical status

## 2024-11-27 NOTE — CONSULT NOTE ADULT - NS ATTEST RISK PROBLEM GEN_ALL_CORE FT
- Patient has an illness which poses a threat to life or bodily function without treatment  - The doses of the antimicrobials will be adjusted according to the estimated creatinine clearance (using the Cockroft-Gault equation)  - I discussed my recommendations with the primary team housestaff / Podiatry   - This patient is on drug therapy requiring intensive monitoring for toxicity. Vancomycin/ requires intensive drug monitoring due to concerns for possible adverse effects, including acute kidney injury and will be monitored with basic metabolic panel,  Vancomycin levels, to be done while on therapy

## 2024-11-28 LAB
ALBUMIN SERPL ELPH-MCNC: 2.9 G/DL — LOW (ref 3.5–5.2)
ALP SERPL-CCNC: 66 U/L — SIGNIFICANT CHANGE UP (ref 30–115)
ALT FLD-CCNC: 14 U/L — SIGNIFICANT CHANGE UP (ref 0–41)
ANION GAP SERPL CALC-SCNC: 12 MMOL/L — SIGNIFICANT CHANGE UP (ref 7–14)
AST SERPL-CCNC: 20 U/L — SIGNIFICANT CHANGE UP (ref 0–41)
BASOPHILS # BLD AUTO: 0.08 K/UL — SIGNIFICANT CHANGE UP (ref 0–0.2)
BASOPHILS NFR BLD AUTO: 0.7 % — SIGNIFICANT CHANGE UP (ref 0–1)
BILIRUB SERPL-MCNC: 1.1 MG/DL — SIGNIFICANT CHANGE UP (ref 0.2–1.2)
BUN SERPL-MCNC: 22 MG/DL — HIGH (ref 10–20)
CALCIUM SERPL-MCNC: 8.7 MG/DL — SIGNIFICANT CHANGE UP (ref 8.4–10.4)
CHLORIDE SERPL-SCNC: 90 MMOL/L — LOW (ref 98–110)
CO2 SERPL-SCNC: 24 MMOL/L — SIGNIFICANT CHANGE UP (ref 17–32)
CREAT SERPL-MCNC: 0.8 MG/DL — SIGNIFICANT CHANGE UP (ref 0.7–1.5)
EGFR: 96 ML/MIN/1.73M2 — SIGNIFICANT CHANGE UP
EOSINOPHIL # BLD AUTO: 0.14 K/UL — SIGNIFICANT CHANGE UP (ref 0–0.7)
EOSINOPHIL NFR BLD AUTO: 1.3 % — SIGNIFICANT CHANGE UP (ref 0–8)
GLUCOSE BLDC GLUCOMTR-MCNC: 162 MG/DL — HIGH (ref 70–99)
GLUCOSE BLDC GLUCOMTR-MCNC: 166 MG/DL — HIGH (ref 70–99)
GLUCOSE BLDC GLUCOMTR-MCNC: 200 MG/DL — HIGH (ref 70–99)
GLUCOSE BLDC GLUCOMTR-MCNC: 289 MG/DL — HIGH (ref 70–99)
GLUCOSE SERPL-MCNC: 289 MG/DL — HIGH (ref 70–99)
HCT VFR BLD CALC: 35.2 % — LOW (ref 42–52)
HGB BLD-MCNC: 11.7 G/DL — LOW (ref 14–18)
IMM GRANULOCYTES NFR BLD AUTO: 1.8 % — HIGH (ref 0.1–0.3)
LYMPHOCYTES # BLD AUTO: 1.22 K/UL — SIGNIFICANT CHANGE UP (ref 1.2–3.4)
LYMPHOCYTES # BLD AUTO: 11 % — LOW (ref 20.5–51.1)
MAGNESIUM SERPL-MCNC: 1.7 MG/DL — LOW (ref 1.8–2.4)
MCHC RBC-ENTMCNC: 30.5 PG — SIGNIFICANT CHANGE UP (ref 27–31)
MCHC RBC-ENTMCNC: 33.2 G/DL — SIGNIFICANT CHANGE UP (ref 32–37)
MCV RBC AUTO: 91.7 FL — SIGNIFICANT CHANGE UP (ref 80–94)
MONOCYTES # BLD AUTO: 1.01 K/UL — HIGH (ref 0.1–0.6)
MONOCYTES NFR BLD AUTO: 9.1 % — SIGNIFICANT CHANGE UP (ref 1.7–9.3)
MRSA PCR RESULT.: NEGATIVE — SIGNIFICANT CHANGE UP
NEUTROPHILS # BLD AUTO: 8.48 K/UL — HIGH (ref 1.4–6.5)
NEUTROPHILS NFR BLD AUTO: 76.1 % — HIGH (ref 42.2–75.2)
NRBC # BLD: 0 /100 WBCS — SIGNIFICANT CHANGE UP (ref 0–0)
PLATELET # BLD AUTO: 175 K/UL — SIGNIFICANT CHANGE UP (ref 130–400)
PMV BLD: 10.7 FL — HIGH (ref 7.4–10.4)
POTASSIUM SERPL-MCNC: 4.6 MMOL/L — SIGNIFICANT CHANGE UP (ref 3.5–5)
POTASSIUM SERPL-SCNC: 4.6 MMOL/L — SIGNIFICANT CHANGE UP (ref 3.5–5)
PROT SERPL-MCNC: 6.1 G/DL — SIGNIFICANT CHANGE UP (ref 6–8)
RBC # BLD: 3.84 M/UL — LOW (ref 4.7–6.1)
RBC # FLD: 13.8 % — SIGNIFICANT CHANGE UP (ref 11.5–14.5)
SODIUM SERPL-SCNC: 126 MMOL/L — LOW (ref 135–146)
WBC # BLD: 11.13 K/UL — HIGH (ref 4.8–10.8)
WBC # FLD AUTO: 11.13 K/UL — HIGH (ref 4.8–10.8)

## 2024-11-28 RX ADMIN — METOPROLOL TARTRATE 50 MILLIGRAM(S): 100 TABLET, FILM COATED ORAL at 05:25

## 2024-11-28 RX ADMIN — Medication 25 GRAM(S): at 12:20

## 2024-11-28 RX ADMIN — Medication 300 MILLIGRAM(S): at 17:47

## 2024-11-28 RX ADMIN — INSULIN GLARGINE 20 UNIT(S): 100 INJECTION, SOLUTION SUBCUTANEOUS at 21:12

## 2024-11-28 RX ADMIN — CEFEPIME 100 MILLIGRAM(S): 2 INJECTION, POWDER, FOR SOLUTION INTRAVENOUS at 21:12

## 2024-11-28 RX ADMIN — Medication 7 UNIT(S): at 12:19

## 2024-11-28 RX ADMIN — Medication 2: at 17:46

## 2024-11-28 RX ADMIN — Medication 7 UNIT(S): at 09:24

## 2024-11-28 RX ADMIN — CEFEPIME 100 MILLIGRAM(S): 2 INJECTION, POWDER, FOR SOLUTION INTRAVENOUS at 05:40

## 2024-11-28 RX ADMIN — CEFEPIME 100 MILLIGRAM(S): 2 INJECTION, POWDER, FOR SOLUTION INTRAVENOUS at 12:16

## 2024-11-28 RX ADMIN — Medication 2: at 12:14

## 2024-11-28 RX ADMIN — METRONIDAZOLE 500 MILLIGRAM(S): 500 TABLET ORAL at 04:08

## 2024-11-28 RX ADMIN — Medication 7 UNIT(S): at 17:46

## 2024-11-28 RX ADMIN — Medication 6: at 09:10

## 2024-11-28 RX ADMIN — ACETAMINOPHEN, DIPHENHYDRAMINE HCL, PHENYLEPHRINE HCL 3 MILLIGRAM(S): 325; 25; 5 TABLET ORAL at 21:12

## 2024-11-28 RX ADMIN — Medication 300 MILLIGRAM(S): at 04:08

## 2024-11-28 RX ADMIN — METRONIDAZOLE 500 MILLIGRAM(S): 500 TABLET ORAL at 17:47

## 2024-11-28 NOTE — PROGRESS NOTE ADULT - SUBJECTIVE AND OBJECTIVE BOX
HODAN ROBYN  69y  [facility] 4B 019 A      Patient is a 69y old  Male who presents with a chief complaint of     My note supersedes the resident's note      INTERVAL HPI/OVERNIGHT EVENTS:        REVIEW OF SYSTEMS:  CONSTITUTIONAL: No fever, weight loss, or fatigue  EYES: No eye pain, visual disturbances, or discharge  ENMT:  No difficulty hearing, tinnitus, vertigo; No sinus or throat pain  NECK: No pain or stiffness  BREASTS: No pain, masses, or nipple discharge  RESPIRATORY: No cough, wheezing, chills or hemoptysis; No shortness of breath  CARDIOVASCULAR: No chest pain, palpitations, dizziness, or leg swelling  GASTROINTESTINAL: No abdominal or epigastric pain. No nausea, vomiting, or hematemesis; No diarrhea or constipation. No melena or hematochezia.  GENITOURINARY: No dysuria, frequency, hematuria, or incontinence  NEUROLOGICAL: No headaches, memory loss, loss of strength, numbness, or tremors  SKIN: No itching, burning, rashes, or lesions   LYMPH NODES: No enlarged glands  ENDOCRINE: No heat or cold intolerance; No hair loss  MUSCULOSKELETAL: No joint pain or swelling; No muscle, back, or extremity pain  PSYCHIATRIC: No depression, anxiety, mood swings, or difficulty sleeping  HEME/LYMPH: No easy bruising, or bleeding gums  ALLERY AND IMMUNOLOGIC: No hives or eczema  FAMILY HISTORY:    T(C): 36.5 (11-27-24 @ 15:00), Max: 36.5 (11-27-24 @ 15:00)  HR: 85 (11-28-24 @ 05:10) (82 - 85)  BP: 150/90 (11-28-24 @ 05:10) (128/85 - 150/90)  RR: 18 (11-27-24 @ 15:00) (18 - 18)  SpO2: 98% (11-27-24 @ 15:00) (98% - 98%)  Wt(kg): --Vital Signs Last 24 Hrs  T(C): 36.5 (27 Nov 2024 15:00), Max: 36.5 (27 Nov 2024 15:00)  T(F): 97.7 (27 Nov 2024 15:00), Max: 97.7 (27 Nov 2024 15:00)  HR: 85 (28 Nov 2024 05:10) (82 - 85)  BP: 150/90 (28 Nov 2024 05:10) (128/85 - 150/90)  BP(mean): --  RR: 18 (27 Nov 2024 15:00) (18 - 18)  SpO2: 98% (27 Nov 2024 15:00) (98% - 98%)    Parameters below as of 27 Nov 2024 15:00  Patient On (Oxygen Delivery Method): room air        PHYSICAL EXAM:  GENERAL: NAD,   HEAD:  Atraumatic, Normocephalic  EYES: EOMI, PERRLA, conjunctiva and sclera clear  ENMT: No tonsillar erythema, exudates, or enlargement; Moist mucous membranes, Good dentition, No lesions  NECK: Supple, No JVD, Normal thyroid  NERVOUS SYSTEM:  Alert & Oriented X3, Good concentration; Motor Strength 5/5 B/L upper and lower extremities; DTRs 2+ intact and symmetric  PULM: Clear to auscultation bilaterally  CARDIAC: Regular rate and rhythm; No murmurs, rubs, or gallops  GI: Soft, Nontender, Nondistended; Bowel sounds present  EXTREMITIES:   L foot wrapped   LYMPH: No lymphadenopathy noted  SKIN: No rashes or lesions    Consultant(s) Notes Reviewed:  [x ] YES  [ ] NO  Care Discussed with Consultants/Other Providers [ x] YES  [ ] NO    LABS:                            12.0   12.12 )-----------( 208      ( 27 Nov 2024 05:30 )             36.6   11-27    126[L]  |  87[L]  |  18  ----------------------------<  340[H]  4.9   |  27  |  0.8    Ca    9.3      27 Nov 2024 05:30  Mg     1.6     11-27    TPro  7.3  /  Alb  3.5  /  TBili  1.1  /  DBili  x   /  AST  26  /  ALT  18  /  AlkPhos  89  11-26            Culture - Blood (collected 26 Nov 2024 23:45)  Source: .Blood BLOOD  Preliminary Report (28 Nov 2024 07:01):    No growth at 24 hours    Culture - Blood (collected 26 Nov 2024 23:45)  Source: .Blood BLOOD  Preliminary Report (28 Nov 2024 07:01):    No growth at 24 hours      acetaminophen     Tablet .. 650 milliGRAM(s) Oral every 6 hours PRN  aluminum hydroxide/magnesium hydroxide/simethicone Suspension 30 milliLiter(s) Oral every 4 hours PRN  cefepime   IVPB 2000 milliGRAM(s) IV Intermittent every 8 hours  dextrose 5%. 1000 milliLiter(s) IV Continuous <Continuous>  dextrose 5%. 1000 milliLiter(s) IV Continuous <Continuous>  dextrose 50% Injectable 25 Gram(s) IV Push once  dextrose 50% Injectable 12.5 Gram(s) IV Push once  dextrose 50% Injectable 25 Gram(s) IV Push once  dextrose Oral Gel 15 Gram(s) Oral once PRN  enoxaparin Injectable 40 milliGRAM(s) SubCutaneous every 24 hours  glucagon  Injectable 1 milliGRAM(s) IntraMuscular once  insulin glargine Injectable (LANTUS) 20 Unit(s) SubCutaneous at bedtime  insulin lispro (ADMELOG) corrective regimen sliding scale   SubCutaneous three times a day before meals  insulin lispro (ADMELOG) corrective regimen sliding scale   SubCutaneous at bedtime  insulin lispro Injectable (ADMELOG) 7 Unit(s) SubCutaneous three times a day before meals  magnesium sulfate  IVPB 2 Gram(s) IV Intermittent once  melatonin 3 milliGRAM(s) Oral at bedtime PRN  metoprolol succinate ER 50 milliGRAM(s) Oral daily  metroNIDAZOLE    Tablet 500 milliGRAM(s) Oral two times a day  ondansetron Injectable 4 milliGRAM(s) IV Push every 8 hours PRN  sodium chloride 0.9%. 1000 milliLiter(s) IV Continuous <Continuous>  vancomycin  IVPB 1500 milliGRAM(s) IV Intermittent every 12 hours      HEALTH ISSUES - PROBLEM Dx:          Case Discussed with House Staff   45 minutes spent on total encounter; more than 50% of the visit was spent counseling and/or coordinating care by the attending physician.    Spectra [phone]

## 2024-11-28 NOTE — PROGRESS NOTE ADULT - SUBJECTIVE AND OBJECTIVE BOX
Podiatry Progress Note    Subjective:  ROBYN PETTIT is a  69y Male.   Seen bedside.   Patient is a 69y old  Male who presents with a chief complaint of     Past Medical History and Surgical History  PAST MEDICAL & SURGICAL HISTORY:  HTN (hypertension)      Diabetic on diet only      Cirrhosis      CAD (coronary artery disease)      Anemia of chronic disorder      Status post cardiac revascularization with bypass aortocoronary anastomosis of five coronary vessels  double      History of cholecystectomy           Objective:  Vital Signs Last 24 Hrs  T(C): 36.4 (28 Nov 2024 07:00), Max: 36.5 (27 Nov 2024 15:00)  T(F): 97.6 (28 Nov 2024 07:00), Max: 97.7 (27 Nov 2024 15:00)  HR: 81 (28 Nov 2024 07:00) (81 - 85)  BP: 127/83 (28 Nov 2024 07:00) (127/83 - 150/90)  BP(mean): --  RR: 18 (28 Nov 2024 07:00) (18 - 18)  SpO2: 100% (28 Nov 2024 07:00) (98% - 100%)    Parameters below as of 28 Nov 2024 07:00  Patient On (Oxygen Delivery Method): room air                            11.7   11.13 )-----------( 175      ( 28 Nov 2024 07:11 )             35.2                 11-28    126[L]  |  90[L]  |  22[H]  ----------------------------<  289[H]  4.6   |  24  |  0.8    Ca    8.7      28 Nov 2024 07:11  Mg     1.7     11-28    TPro  6.1  /  Alb  2.9[L]  /  TBili  1.1  /  DBili  x   /  AST  20  /  ALT  14  /  AlkPhos  66  11-28        Physical Exam Left Lower Extremity Focused:   Derm:  -Ulceration noted to plantar hallux with overlying necrotic cap, bogginess noted plantarly with purulent drainage.   -Erythema and edema noted throughout the left foot to the level of the ankle.   Vascular: DP and PT Pulses Diminished; Foot is Warm to the touch; Capillary Refill Time < 3 Seconds;    Neuro: Protective Sensation Diminished / Moderately Neuropathic   MSK: Minimal Pain On Palpation at Wound Site     LEFT FOOT XR:   No acute displaced fracture. Moderate midfoot degenerative changes.   Vascular calcifications. No radiographic evidence of subcutaneous   emphysema or osseous erosions.    LEFT FOOT MRI  IMPRESSION:  Image quality degraded by motion artifact.  Plantar hallux ulcer at level of distal phalanx with no drainable fluid collection or MRI evidence of osteomyelitis.  First MTP joint effusion.  Osteoarthritis of the midfoot joints, partially imaged.    AV DUPLEX   Impression:  Normal arterial flow in the lower extremity. Diminished velocities within the right posterior tibial and peroneal arteries with no evidence of significant arterial occlusive disease or arterial occlusions    No evidence of deep venous thrombosis in either lower extremity.    Assessment:  Infected Hallux, Left Foot    Plan:  Chart reviewed and Patient evaluated. All Questions and Concerns Addressed and Answered  XR Imaging, MRI LEFT, AV DUPLEX; reviewed and stated above  Request Vascular consult   Local Wound Care; Wound dressed with betadine DSD  Continue LWD daily q24H   Weight Bearing Status; WBAT  Surgical Shoe Left Foot ordered   Continue IV ABX as per ID recommendations  Possible Surgical Debridement; Pending Vascular evaluation  Discussed plan with attending, Dr. Rodríguez, DPM      Podiatry   Please message on teams for any further questions      Podiatry Progress Note    Subjective:  ROBYN PETTIT is a  69y Male.   Seen bedside.   Patient is a 69y old  Male who presents with a chief complaint of     Past Medical History and Surgical History  PAST MEDICAL & SURGICAL HISTORY:  HTN (hypertension)      Diabetic on diet only      Cirrhosis      CAD (coronary artery disease)      Anemia of chronic disorder      Status post cardiac revascularization with bypass aortocoronary anastomosis of five coronary vessels  double      History of cholecystectomy           Objective:  Vital Signs Last 24 Hrs  T(C): 36.4 (28 Nov 2024 07:00), Max: 36.5 (27 Nov 2024 15:00)  T(F): 97.6 (28 Nov 2024 07:00), Max: 97.7 (27 Nov 2024 15:00)  HR: 81 (28 Nov 2024 07:00) (81 - 85)  BP: 127/83 (28 Nov 2024 07:00) (127/83 - 150/90)  BP(mean): --  RR: 18 (28 Nov 2024 07:00) (18 - 18)  SpO2: 100% (28 Nov 2024 07:00) (98% - 100%)    Parameters below as of 28 Nov 2024 07:00  Patient On (Oxygen Delivery Method): room air                            11.7   11.13 )-----------( 175      ( 28 Nov 2024 07:11 )             35.2                 11-28    126[L]  |  90[L]  |  22[H]  ----------------------------<  289[H]  4.6   |  24  |  0.8    Ca    8.7      28 Nov 2024 07:11  Mg     1.7     11-28    TPro  6.1  /  Alb  2.9[L]  /  TBili  1.1  /  DBili  x   /  AST  20  /  ALT  14  /  AlkPhos  66  11-28        Physical Exam Left Lower Extremity Focused:   Derm:  -Ulceration noted to plantar hallux with overlying necrotic cap, bogginess noted plantarly with purulent drainage.   -Erythema and edema noted throughout the left foot to the level of the ankle.   Vascular: DP and PT Pulses Diminished; Foot is Warm to the touch; Capillary Refill Time < 3 Seconds;    Neuro: Protective Sensation Diminished / Moderately Neuropathic   MSK: Minimal Pain On Palpation at Wound Site     LEFT FOOT XR:   No acute displaced fracture. Moderate midfoot degenerative changes.   Vascular calcifications. No radiographic evidence of subcutaneous   emphysema or osseous erosions.    LEFT FOOT MRI  IMPRESSION:  Image quality degraded by motion artifact.  Plantar hallux ulcer at level of distal phalanx with no drainable fluid collection or MRI evidence of osteomyelitis.  First MTP joint effusion.  Osteoarthritis of the midfoot joints, partially imaged.    AV DUPLEX   Impression:  Normal arterial flow in the lower extremity. Diminished velocities within the right posterior tibial and peroneal arteries with no evidence of significant arterial occlusive disease or arterial occlusions    No evidence of deep venous thrombosis in either lower extremity.    Assessment:  Infected Hallux, Left Foot    Plan:  Chart reviewed and Patient evaluated. All Questions and Concerns Addressed and Answered  XR Imaging, MRI LEFT, AV DUPLEX; reviewed and stated above  Request Vascular consult   Local Wound Care; Wound dressed with betadine DSD  Continue LWD daily q24H   Weight Bearing Status; WBAT  Surgical Shoe Left Foot ordered   Continue IV ABX as per ID recommendations  Possible Surgical Debridement; Pending Vascular evaluation  - Further Discussion to be had with patient regarding Sx intervention  Discussed plan with attending, Dr. Rodríguez, DPM      Podiatry   Please message on teams for any further questions      Podiatry Progress Note    Subjective:  ROBYN PETTIT is a  69y Male.   Seen bedside.   Patient is a 69y old  Male who presents with a chief complaint of     Past Medical History and Surgical History  PAST MEDICAL & SURGICAL HISTORY:  HTN (hypertension)      Diabetic on diet only      Cirrhosis      CAD (coronary artery disease)      Anemia of chronic disorder      Status post cardiac revascularization with bypass aortocoronary anastomosis of five coronary vessels  double      History of cholecystectomy           Objective:  Vital Signs Last 24 Hrs  T(C): 36.4 (28 Nov 2024 07:00), Max: 36.5 (27 Nov 2024 15:00)  T(F): 97.6 (28 Nov 2024 07:00), Max: 97.7 (27 Nov 2024 15:00)  HR: 81 (28 Nov 2024 07:00) (81 - 85)  BP: 127/83 (28 Nov 2024 07:00) (127/83 - 150/90)  BP(mean): --  RR: 18 (28 Nov 2024 07:00) (18 - 18)  SpO2: 100% (28 Nov 2024 07:00) (98% - 100%)    Parameters below as of 28 Nov 2024 07:00  Patient On (Oxygen Delivery Method): room air                            11.7   11.13 )-----------( 175      ( 28 Nov 2024 07:11 )             35.2                 11-28    126[L]  |  90[L]  |  22[H]  ----------------------------<  289[H]  4.6   |  24  |  0.8    Ca    8.7      28 Nov 2024 07:11  Mg     1.7     11-28    TPro  6.1  /  Alb  2.9[L]  /  TBili  1.1  /  DBili  x   /  AST  20  /  ALT  14  /  AlkPhos  66  11-28        Physical Exam Left Lower Extremity Focused:   Derm:  -Ulceration noted to plantar hallux with overlying necrotic cap, bogginess noted plantarly with purulent drainage.   -Erythema and edema noted throughout the left foot to the level of the ankle.   Vascular: DP and PT Pulses Diminished; Foot is Warm to the touch; Capillary Refill Time < 3 Seconds;    Neuro: Protective Sensation Diminished / Moderately Neuropathic   MSK: Minimal Pain On Palpation at Wound Site     LEFT FOOT XR:   No acute displaced fracture. Moderate midfoot degenerative changes.   Vascular calcifications. No radiographic evidence of subcutaneous   emphysema or osseous erosions.    LEFT FOOT MRI  IMPRESSION:  Image quality degraded by motion artifact.  Plantar hallux ulcer at level of distal phalanx with no drainable fluid collection or MRI evidence of osteomyelitis.  First MTP joint effusion.  Osteoarthritis of the midfoot joints, partially imaged.    AV DUPLEX   Impression:  Normal arterial flow in the lower extremity. Diminished velocities within the right posterior tibial and peroneal arteries with no evidence of significant arterial occlusive disease or arterial occlusions    No evidence of deep venous thrombosis in either lower extremity.    Assessment:  Infected Hallux, Left Foot    Plan:  Chart reviewed and Patient evaluated. All Questions and Concerns Addressed and Answered  XR Imaging, MRI LEFT, AV DUPLEX; reviewed and stated above  Local Wound Care; Wound dressed with betadine DSD  Continue LWD daily q24H   Weight Bearing Status; WBAT  Surgical Shoe Left Foot ordered   Continue IV ABX as per ID recommendations  Discussed plan with attending, Dr. Rodríguez, DPM      Podiatry   Please message on teams for any further questions

## 2024-11-29 LAB
ALBUMIN SERPL ELPH-MCNC: 3.3 G/DL — LOW (ref 3.5–5.2)
ALP SERPL-CCNC: 80 U/L — SIGNIFICANT CHANGE UP (ref 30–115)
ALT FLD-CCNC: 18 U/L — SIGNIFICANT CHANGE UP (ref 0–41)
ANION GAP SERPL CALC-SCNC: 11 MMOL/L — SIGNIFICANT CHANGE UP (ref 7–14)
AST SERPL-CCNC: 29 U/L — SIGNIFICANT CHANGE UP (ref 0–41)
BASOPHILS # BLD AUTO: 0.13 K/UL — SIGNIFICANT CHANGE UP (ref 0–0.2)
BASOPHILS NFR BLD AUTO: 1 % — SIGNIFICANT CHANGE UP (ref 0–1)
BILIRUB SERPL-MCNC: 1.2 MG/DL — SIGNIFICANT CHANGE UP (ref 0.2–1.2)
BUN SERPL-MCNC: 26 MG/DL — HIGH (ref 10–20)
CALCIUM SERPL-MCNC: 9.8 MG/DL — SIGNIFICANT CHANGE UP (ref 8.4–10.5)
CHLORIDE SERPL-SCNC: 90 MMOL/L — LOW (ref 98–110)
CO2 SERPL-SCNC: 27 MMOL/L — SIGNIFICANT CHANGE UP (ref 17–32)
CREAT SERPL-MCNC: 0.8 MG/DL — SIGNIFICANT CHANGE UP (ref 0.7–1.5)
EGFR: 96 ML/MIN/1.73M2 — SIGNIFICANT CHANGE UP
EOSINOPHIL # BLD AUTO: 0.35 K/UL — SIGNIFICANT CHANGE UP (ref 0–0.7)
EOSINOPHIL NFR BLD AUTO: 2.8 % — SIGNIFICANT CHANGE UP (ref 0–8)
GLUCOSE BLDC GLUCOMTR-MCNC: 198 MG/DL — HIGH (ref 70–99)
GLUCOSE BLDC GLUCOMTR-MCNC: 201 MG/DL — HIGH (ref 70–99)
GLUCOSE BLDC GLUCOMTR-MCNC: 201 MG/DL — HIGH (ref 70–99)
GLUCOSE BLDC GLUCOMTR-MCNC: 202 MG/DL — HIGH (ref 70–99)
GLUCOSE BLDC GLUCOMTR-MCNC: 218 MG/DL — HIGH (ref 70–99)
GLUCOSE SERPL-MCNC: 170 MG/DL — HIGH (ref 70–99)
HCT VFR BLD CALC: 38.9 % — LOW (ref 42–52)
HGB BLD-MCNC: 12.7 G/DL — LOW (ref 14–18)
IMM GRANULOCYTES NFR BLD AUTO: 2.9 % — HIGH (ref 0.1–0.3)
LYMPHOCYTES # BLD AUTO: 16.2 % — LOW (ref 20.5–51.1)
LYMPHOCYTES # BLD AUTO: 2.02 K/UL — SIGNIFICANT CHANGE UP (ref 1.2–3.4)
MCHC RBC-ENTMCNC: 30.2 PG — SIGNIFICANT CHANGE UP (ref 27–31)
MCHC RBC-ENTMCNC: 32.6 G/DL — SIGNIFICANT CHANGE UP (ref 32–37)
MCV RBC AUTO: 92.4 FL — SIGNIFICANT CHANGE UP (ref 80–94)
MONOCYTES # BLD AUTO: 1.16 K/UL — HIGH (ref 0.1–0.6)
MONOCYTES NFR BLD AUTO: 9.3 % — SIGNIFICANT CHANGE UP (ref 1.7–9.3)
NEUTROPHILS # BLD AUTO: 8.46 K/UL — HIGH (ref 1.4–6.5)
NEUTROPHILS NFR BLD AUTO: 67.8 % — SIGNIFICANT CHANGE UP (ref 42.2–75.2)
NRBC # BLD: 0 /100 WBCS — SIGNIFICANT CHANGE UP (ref 0–0)
PLATELET # BLD AUTO: 251 K/UL — SIGNIFICANT CHANGE UP (ref 130–400)
PMV BLD: 10.5 FL — HIGH (ref 7.4–10.4)
POTASSIUM SERPL-MCNC: 4.6 MMOL/L — SIGNIFICANT CHANGE UP (ref 3.5–5)
POTASSIUM SERPL-SCNC: 4.6 MMOL/L — SIGNIFICANT CHANGE UP (ref 3.5–5)
PROT SERPL-MCNC: 7.2 G/DL — SIGNIFICANT CHANGE UP (ref 6–8)
RBC # BLD: 4.21 M/UL — LOW (ref 4.7–6.1)
RBC # FLD: 14.1 % — SIGNIFICANT CHANGE UP (ref 11.5–14.5)
SODIUM SERPL-SCNC: 128 MMOL/L — LOW (ref 135–146)
VANCOMYCIN FLD-MCNC: 15.9 UG/ML — HIGH (ref 5–10)
WBC # BLD: 12.48 K/UL — HIGH (ref 4.8–10.8)
WBC # FLD AUTO: 12.48 K/UL — HIGH (ref 4.8–10.8)

## 2024-11-29 PROCEDURE — 73630 X-RAY EXAM OF FOOT: CPT | Mod: 26,LT

## 2024-11-29 PROCEDURE — 71046 X-RAY EXAM CHEST 2 VIEWS: CPT | Mod: 26

## 2024-11-29 PROCEDURE — 88311 DECALCIFY TISSUE: CPT | Mod: 26

## 2024-11-29 PROCEDURE — 99233 SBSQ HOSP IP/OBS HIGH 50: CPT

## 2024-11-29 PROCEDURE — 88307 TISSUE EXAM BY PATHOLOGIST: CPT | Mod: 26

## 2024-11-29 PROCEDURE — 88304 TISSUE EXAM BY PATHOLOGIST: CPT | Mod: 26

## 2024-11-29 RX ORDER — METOPROLOL TARTRATE 100 MG/1
50 TABLET, FILM COATED ORAL DAILY
Refills: 0 | Status: DISCONTINUED | OUTPATIENT
Start: 2024-11-29 | End: 2024-12-01

## 2024-11-29 RX ORDER — ONDANSETRON HYDROCHLORIDE 4 MG/1
4 TABLET, FILM COATED ORAL ONCE
Refills: 0 | Status: DISCONTINUED | OUTPATIENT
Start: 2024-11-29 | End: 2024-11-29

## 2024-11-29 RX ORDER — INSULIN GLARGINE 100 [IU]/ML
20 INJECTION, SOLUTION SUBCUTANEOUS AT BEDTIME
Refills: 0 | Status: DISCONTINUED | OUTPATIENT
Start: 2024-11-29 | End: 2024-12-01

## 2024-11-29 RX ORDER — 0.9 % SODIUM CHLORIDE 0.9 %
1000 INTRAVENOUS SOLUTION INTRAVENOUS
Refills: 0 | Status: DISCONTINUED | OUTPATIENT
Start: 2024-11-29 | End: 2024-11-29

## 2024-11-29 RX ORDER — ENOXAPARIN SODIUM 30 MG/.3ML
40 INJECTION SUBCUTANEOUS EVERY 24 HOURS
Refills: 0 | Status: DISCONTINUED | OUTPATIENT
Start: 2024-11-29 | End: 2024-12-01

## 2024-11-29 RX ORDER — VANCOMYCIN HCL 900 MCG/MG
1500 POWDER (GRAM) MISCELLANEOUS EVERY 12 HOURS
Refills: 0 | Status: DISCONTINUED | OUTPATIENT
Start: 2024-11-29 | End: 2024-11-30

## 2024-11-29 RX ORDER — 0.9 % SODIUM CHLORIDE 0.9 %
1000 INTRAVENOUS SOLUTION INTRAVENOUS
Refills: 0 | Status: DISCONTINUED | OUTPATIENT
Start: 2024-11-29 | End: 2024-12-01

## 2024-11-29 RX ORDER — MAGNESIUM, ALUMINUM HYDROXIDE 200-225/5
30 SUSPENSION, ORAL (FINAL DOSE FORM) ORAL EVERY 4 HOURS
Refills: 0 | Status: DISCONTINUED | OUTPATIENT
Start: 2024-11-29 | End: 2024-12-01

## 2024-11-29 RX ORDER — ONDANSETRON HYDROCHLORIDE 4 MG/1
4 TABLET, FILM COATED ORAL EVERY 8 HOURS
Refills: 0 | Status: DISCONTINUED | OUTPATIENT
Start: 2024-11-29 | End: 2024-12-01

## 2024-11-29 RX ORDER — ACETAMINOPHEN, DIPHENHYDRAMINE HCL, PHENYLEPHRINE HCL 325; 25; 5 MG/1; MG/1; MG/1
3 TABLET ORAL AT BEDTIME
Refills: 0 | Status: DISCONTINUED | OUTPATIENT
Start: 2024-11-29 | End: 2024-12-01

## 2024-11-29 RX ORDER — SODIUM CHLORIDE 9 MG/ML
1000 INJECTION, SOLUTION INTRAMUSCULAR; INTRAVENOUS; SUBCUTANEOUS
Refills: 0 | Status: DISCONTINUED | OUTPATIENT
Start: 2024-11-29 | End: 2024-12-01

## 2024-11-29 RX ORDER — ACETAMINOPHEN 500MG 500 MG/1
650 TABLET, COATED ORAL EVERY 6 HOURS
Refills: 0 | Status: DISCONTINUED | OUTPATIENT
Start: 2024-11-29 | End: 2024-12-01

## 2024-11-29 RX ORDER — CEFEPIME 2 G/1
2000 INJECTION, POWDER, FOR SOLUTION INTRAVENOUS EVERY 8 HOURS
Refills: 0 | Status: DISCONTINUED | OUTPATIENT
Start: 2024-11-29 | End: 2024-11-30

## 2024-11-29 RX ORDER — METRONIDAZOLE 500 MG/1
500 TABLET ORAL
Refills: 0 | Status: DISCONTINUED | OUTPATIENT
Start: 2024-11-29 | End: 2024-11-30

## 2024-11-29 RX ORDER — GLUCAGON INJECTION, SOLUTION 0.5 MG/.1ML
1 INJECTION, SOLUTION SUBCUTANEOUS ONCE
Refills: 0 | Status: DISCONTINUED | OUTPATIENT
Start: 2024-11-29 | End: 2024-12-01

## 2024-11-29 RX ADMIN — METRONIDAZOLE 500 MILLIGRAM(S): 500 TABLET ORAL at 05:14

## 2024-11-29 RX ADMIN — METRONIDAZOLE 500 MILLIGRAM(S): 500 TABLET ORAL at 17:51

## 2024-11-29 RX ADMIN — CEFEPIME 100 MILLIGRAM(S): 2 INJECTION, POWDER, FOR SOLUTION INTRAVENOUS at 21:52

## 2024-11-29 RX ADMIN — Medication 300 MILLIGRAM(S): at 17:51

## 2024-11-29 RX ADMIN — CEFEPIME 100 MILLIGRAM(S): 2 INJECTION, POWDER, FOR SOLUTION INTRAVENOUS at 14:52

## 2024-11-29 RX ADMIN — METOPROLOL TARTRATE 50 MILLIGRAM(S): 100 TABLET, FILM COATED ORAL at 05:14

## 2024-11-29 RX ADMIN — Medication 300 MILLIGRAM(S): at 05:14

## 2024-11-29 RX ADMIN — CEFEPIME 100 MILLIGRAM(S): 2 INJECTION, POWDER, FOR SOLUTION INTRAVENOUS at 05:14

## 2024-11-29 RX ADMIN — INSULIN GLARGINE 20 UNIT(S): 100 INJECTION, SOLUTION SUBCUTANEOUS at 21:56

## 2024-11-29 NOTE — PROGRESS NOTE ADULT - SUBJECTIVE AND OBJECTIVE BOX
ROBYN PETTIT 69y Male  MRN#: [id number]     Hospital Day: 3d    SUBJECTIVE     No overnight events. Patient seen and evaluated at bedside, has no acute complaints. NPO today for left Hallux amputation                                             ----------------------------------------------------------  OBJECTIVE  PAST MEDICAL & SURGICAL HISTORY  HTN (hypertension)    Diabetic on diet only    Cirrhosis    CAD (coronary artery disease)    Anemia of chronic disorder    Status post cardiac revascularization with bypass aortocoronary anastomosis of five coronary vessels  double    History of cholecystectomy                                              -----------------------------------------------------------  ALLERGIES:  penicillin (Unknown)  tetanus toxoid (Unknown)                                            ------------------------------------------------------------    HOME MEDICATIONS  Home Medications:  furosemide 20 mg oral tablet: 1 tab(s) orally 3 times a week Monday, Wednesday, Friday (26 Nov 2024 23:38)  MetFORMIN (Eqv-Fortamet) 500 mg oral tablet, extended release: 1 tab(s) orally 2 times a day (26 Nov 2024 22:55)  spironolactone 25 mg oral tablet: 1 tab(s) orally once a day (26 Nov 2024 23:38)  Toprol-XL 50 mg oral tablet, extended release: 1 tab(s) orally (26 Nov 2024 22:57)                           MEDICATIONS:  STANDING MEDICATIONS  cefepime   IVPB 2000 milliGRAM(s) IV Intermittent every 8 hours  dextrose 5%. 1000 milliLiter(s) IV Continuous <Continuous>  dextrose 5%. 1000 milliLiter(s) IV Continuous <Continuous>  dextrose 50% Injectable 25 Gram(s) IV Push once  dextrose 50% Injectable 12.5 Gram(s) IV Push once  dextrose 50% Injectable 25 Gram(s) IV Push once  enoxaparin Injectable 40 milliGRAM(s) SubCutaneous every 24 hours  glucagon  Injectable 1 milliGRAM(s) IntraMuscular once  insulin glargine Injectable (LANTUS) 20 Unit(s) SubCutaneous at bedtime  insulin lispro (ADMELOG) corrective regimen sliding scale   SubCutaneous three times a day before meals  insulin lispro (ADMELOG) corrective regimen sliding scale   SubCutaneous at bedtime  insulin lispro Injectable (ADMELOG) 7 Unit(s) SubCutaneous three times a day before meals  metoprolol succinate ER 50 milliGRAM(s) Oral daily  metroNIDAZOLE    Tablet 500 milliGRAM(s) Oral two times a day  sodium chloride 0.9%. 1000 milliLiter(s) IV Continuous <Continuous>  vancomycin  IVPB 1500 milliGRAM(s) IV Intermittent every 12 hours    PRN MEDICATIONS  acetaminophen     Tablet .. 650 milliGRAM(s) Oral every 6 hours PRN  aluminum hydroxide/magnesium hydroxide/simethicone Suspension 30 milliLiter(s) Oral every 4 hours PRN  dextrose Oral Gel 15 Gram(s) Oral once PRN  melatonin 3 milliGRAM(s) Oral at bedtime PRN  ondansetron Injectable 4 milliGRAM(s) IV Push every 8 hours PRN                                            ------------------------------------------------------------  VITAL SIGNS: Last 24 Hours  T(C): 36.6 (29 Nov 2024 08:00), Max: 36.6 (28 Nov 2024 15:00)  T(F): 97.8 (29 Nov 2024 08:00), Max: 97.9 (28 Nov 2024 15:00)  HR: 73 (29 Nov 2024 08:00) (73 - 92)  BP: 121/81 (29 Nov 2024 08:00) (121/81 - 139/91)  BP(mean): 95 (29 Nov 2024 08:00) (95 - 95)  RR: 18 (29 Nov 2024 08:00) (18 - 18)  SpO2: 99% (29 Nov 2024 08:00) (99% - 100%)                                             --------------------------------------------------------------  LABS:                        12.7   12.48 )-----------( 251      ( 29 Nov 2024 06:46 )             38.9     11-29    128[L]  |  90[L]  |  26[H]  ----------------------------<  170[H]  4.6   |  27  |  0.8    Ca    9.8      29 Nov 2024 06:46  Mg     1.7     11-28    TPro  7.2  /  Alb  3.3[L]  /  TBili  1.2  /  DBili  x   /  AST  29  /  ALT  18  /  AlkPhos  80  11-29              Culture - Blood (collected 26 Nov 2024 23:45)  Source: .Blood BLOOD  Preliminary Report (29 Nov 2024 07:01):    No growth at 48 Hours    Culture - Blood (collected 26 Nov 2024 23:45)  Source: .Blood BLOOD  Preliminary Report (29 Nov 2024 07:01):    No growth at 48 Hours            PHYSICAL EXAM:  GENERAL: NAD  NECK: Supple, No JVD  CHEST/LUNG: Clear to auscultation bilaterally. Unlabored respirations  HEART: regular rate and rhythm; No murmurs  ABDOMEN: Soft, Nontender, Nondistended.    EXTREMITIES: Warm. No edema  NERVOUS SYSTEM:  Alert & Oriented X3. No focal deficits                                              --------------------------------------------------------------

## 2024-11-29 NOTE — CHART NOTE - NSCHARTNOTEFT_GEN_A_CORE
Patient seen bedside for Sx planning and discussion.     Sx intervention tomorrow 11/29 with podiatry for Hallux Amputation Left Foot  - After further discussion, patient agrees to Sx intervention and Sx plan, consents for SX  - Patient to go to OR tomorrow as an add-on  - Please have patient NPO MN  - Please draw up AM Labs - CBC, BMP, Coags, T&S  - Please optimize patient Labs for OR  - Please allow for any medical clearance    Thank You!    Please message on Teams for any further questions
PACU ANESTHESIA PACU ADMISSION NOTE      Procedure:  Post op diagnosis    ____ Intubated  TV:______       Rate: ______      FiO2: ______    __x__ Patent Airway    ____ Full return of protective reflexes    ____ Full recovery from anesthesia / sedation to baseline status    Viitals:  see anesthesia record            Mental Status:  __x__ Awake   _____ Alert   _____ Drowsy   _____ Sedated    Nausea/Vomiting: ____ Yes, See Post - Op Orders      _x___ No    Pain Scale (0-10): _____    Treatment: ____ None    x__ See Post - Op/PCA Orders    Post - Operative Fluids:   ____ Oral   _x__ See Post - Op Orders    Plan:         Discharge:   ____Home       ___x__Floor         _____Critical Care    _____Other:_________________    Comments: uneventful perioperative course; no s/s of anesthesia complicatiosn noted; D/C floor when criteria met

## 2024-11-29 NOTE — PROGRESS NOTE ADULT - SUBJECTIVE AND OBJECTIVE BOX
HODANROBYN  69y  [facility] 4B 019 A      Patient is a 69y old  Male who presents with a chief complaint of L Hallux Ulcer (28 Nov 2024 11:25)      My note supersedes the resident's note      INTERVAL HPI/OVERNIGHT EVENTS:    no events overnight     REVIEW OF SYSTEMS:  CONSTITUTIONAL: No fever, weight loss, or fatigue  EYES: No eye pain, visual disturbances, or discharge  ENMT:  No difficulty hearing, tinnitus, vertigo; No sinus or throat pain  NECK: No pain or stiffness  BREASTS: No pain, masses, or nipple discharge  RESPIRATORY: No cough, wheezing, chills or hemoptysis; No shortness of breath  CARDIOVASCULAR: No chest pain, palpitations, dizziness, or leg swelling  GASTROINTESTINAL: No abdominal or epigastric pain. No nausea, vomiting, or hematemesis; No diarrhea or constipation. No melena or hematochezia.  GENITOURINARY: No dysuria, frequency, hematuria, or incontinence  NEUROLOGICAL: No headaches, memory loss, loss of strength, numbness, or tremors  SKIN: No itching, burning, rashes, or lesions   LYMPH NODES: No enlarged glands  ENDOCRINE: No heat or cold intolerance; No hair loss  MUSCULOSKELETAL: No joint pain or swelling; No muscle, back, or extremity pain  PSYCHIATRIC: No depression, anxiety, mood swings, or difficulty sleeping  HEME/LYMPH: No easy bruising, or bleeding gums  ALLERY AND IMMUNOLOGIC: No hives or eczema  FAMILY HISTORY:    T(C): 36.5 (11-29-24 @ 00:12), Max: 36.6 (11-28-24 @ 15:00)  HR: 79 (11-29-24 @ 04:28) (79 - 92)  BP: 139/91 (11-29-24 @ 04:28) (124/78 - 139/91)  RR: 18 (11-29-24 @ 00:12) (18 - 18)  SpO2: 100% (11-29-24 @ 00:12) (100% - 100%)  Wt(kg): --Vital Signs Last 24 Hrs  T(C): 36.5 (29 Nov 2024 00:12), Max: 36.6 (28 Nov 2024 15:00)  T(F): 97.7 (29 Nov 2024 00:12), Max: 97.9 (28 Nov 2024 15:00)  HR: 79 (29 Nov 2024 04:28) (79 - 92)  BP: 139/91 (29 Nov 2024 04:28) (124/78 - 139/91)  BP(mean): --  RR: 18 (29 Nov 2024 00:12) (18 - 18)  SpO2: 100% (29 Nov 2024 00:12) (100% - 100%)    Parameters below as of 29 Nov 2024 00:12  Patient On (Oxygen Delivery Method): room air        PHYSICAL EXAM:  GENERAL: NAD,   HEAD:  Atraumatic, Normocephalic  EYES: EOMI, PERRLA, conjunctiva and sclera clear  ENMT: No tonsillar erythema, exudates, or enlargement; Moist mucous membranes, Good dentition, No lesions  NECK: Supple, No JVD, Normal thyroid  NERVOUS SYSTEM:  Alert & Oriented X3,   PULM: Clear to auscultation bilaterally  CARDIAC: Regular rate and rhythm; No murmurs, rubs, or gallops  GI: Soft, Nontender, Nondistended; Bowel sounds present  EXTREMITIES:  L foot wrapped   LYMPH: No lymphadenopathy noted  SKIN: No rashes or lesions    Consultant(s) Notes Reviewed:  [x ] YES  [ ] NO  Care Discussed with Consultants/Other Providers [ x] YES  [ ] NO    LABS:                            11.7   11.13 )-----------( 175      ( 28 Nov 2024 07:11 )             35.2   11-28    126[L]  |  90[L]  |  22[H]  ----------------------------<  289[H]  4.6   |  24  |  0.8    Ca    8.7      28 Nov 2024 07:11  Mg     1.7     11-28    TPro  6.1  /  Alb  2.9[L]  /  TBili  1.1  /  DBili  x   /  AST  20  /  ALT  14  /  AlkPhos  66  11-28            Culture - Blood (collected 26 Nov 2024 23:45)  Source: .Blood BLOOD  Preliminary Report (29 Nov 2024 07:01):    No growth at 48 Hours    Culture - Blood (collected 26 Nov 2024 23:45)  Source: .Blood BLOOD  Preliminary Report (29 Nov 2024 07:01):    No growth at 48 Hours      acetaminophen     Tablet .. 650 milliGRAM(s) Oral every 6 hours PRN  aluminum hydroxide/magnesium hydroxide/simethicone Suspension 30 milliLiter(s) Oral every 4 hours PRN  cefepime   IVPB 2000 milliGRAM(s) IV Intermittent every 8 hours  dextrose 5%. 1000 milliLiter(s) IV Continuous <Continuous>  dextrose 5%. 1000 milliLiter(s) IV Continuous <Continuous>  dextrose 50% Injectable 25 Gram(s) IV Push once  dextrose 50% Injectable 12.5 Gram(s) IV Push once  dextrose 50% Injectable 25 Gram(s) IV Push once  dextrose Oral Gel 15 Gram(s) Oral once PRN  enoxaparin Injectable 40 milliGRAM(s) SubCutaneous every 24 hours  glucagon  Injectable 1 milliGRAM(s) IntraMuscular once  insulin glargine Injectable (LANTUS) 20 Unit(s) SubCutaneous at bedtime  insulin lispro (ADMELOG) corrective regimen sliding scale   SubCutaneous three times a day before meals  insulin lispro (ADMELOG) corrective regimen sliding scale   SubCutaneous at bedtime  insulin lispro Injectable (ADMELOG) 7 Unit(s) SubCutaneous three times a day before meals  melatonin 3 milliGRAM(s) Oral at bedtime PRN  metoprolol succinate ER 50 milliGRAM(s) Oral daily  metroNIDAZOLE    Tablet 500 milliGRAM(s) Oral two times a day  ondansetron Injectable 4 milliGRAM(s) IV Push every 8 hours PRN  sodium chloride 0.9%. 1000 milliLiter(s) IV Continuous <Continuous>  vancomycin  IVPB 1500 milliGRAM(s) IV Intermittent every 12 hours      HEALTH ISSUES - PROBLEM Dx:          Case Discussed with House Staff   45 minutes spent on total encounter; more than 50% of the visit was spent counseling and/or coordinating care by the attending physician.    Spectra [phone]

## 2024-11-30 LAB
ALBUMIN SERPL ELPH-MCNC: 3 G/DL — LOW (ref 3.5–5.2)
ALP SERPL-CCNC: 72 U/L — SIGNIFICANT CHANGE UP (ref 30–115)
ALT FLD-CCNC: 17 U/L — SIGNIFICANT CHANGE UP (ref 0–41)
ANION GAP SERPL CALC-SCNC: 11 MMOL/L — SIGNIFICANT CHANGE UP (ref 7–14)
AST SERPL-CCNC: 27 U/L — SIGNIFICANT CHANGE UP (ref 0–41)
BASOPHILS # BLD AUTO: 0.1 K/UL — SIGNIFICANT CHANGE UP (ref 0–0.2)
BASOPHILS NFR BLD AUTO: 1.3 % — HIGH (ref 0–1)
BILIRUB SERPL-MCNC: 1 MG/DL — SIGNIFICANT CHANGE UP (ref 0.2–1.2)
BUN SERPL-MCNC: 23 MG/DL — HIGH (ref 10–20)
CALCIUM SERPL-MCNC: 9.3 MG/DL — SIGNIFICANT CHANGE UP (ref 8.4–10.5)
CHLORIDE SERPL-SCNC: 94 MMOL/L — LOW (ref 98–110)
CO2 SERPL-SCNC: 24 MMOL/L — SIGNIFICANT CHANGE UP (ref 17–32)
CREAT SERPL-MCNC: 0.7 MG/DL — SIGNIFICANT CHANGE UP (ref 0.7–1.5)
EGFR: 100 ML/MIN/1.73M2 — SIGNIFICANT CHANGE UP
EOSINOPHIL # BLD AUTO: 0.34 K/UL — SIGNIFICANT CHANGE UP (ref 0–0.7)
EOSINOPHIL NFR BLD AUTO: 4.6 % — SIGNIFICANT CHANGE UP (ref 0–8)
GLUCOSE BLDC GLUCOMTR-MCNC: 165 MG/DL — HIGH (ref 70–99)
GLUCOSE BLDC GLUCOMTR-MCNC: 169 MG/DL — HIGH (ref 70–99)
GLUCOSE BLDC GLUCOMTR-MCNC: 177 MG/DL — HIGH (ref 70–99)
GLUCOSE BLDC GLUCOMTR-MCNC: 227 MG/DL — HIGH (ref 70–99)
GLUCOSE BLDC GLUCOMTR-MCNC: 255 MG/DL — HIGH (ref 70–99)
GLUCOSE SERPL-MCNC: 168 MG/DL — HIGH (ref 70–99)
GRAM STN FLD: SIGNIFICANT CHANGE UP
HCT VFR BLD CALC: 37.6 % — LOW (ref 42–52)
HGB BLD-MCNC: 12.2 G/DL — LOW (ref 14–18)
IMM GRANULOCYTES NFR BLD AUTO: 3.2 % — HIGH (ref 0.1–0.3)
LYMPHOCYTES # BLD AUTO: 1.45 K/UL — SIGNIFICANT CHANGE UP (ref 1.2–3.4)
LYMPHOCYTES # BLD AUTO: 19.4 % — LOW (ref 20.5–51.1)
MAGNESIUM SERPL-MCNC: 1.7 MG/DL — LOW (ref 1.8–2.4)
MCHC RBC-ENTMCNC: 30.2 PG — SIGNIFICANT CHANGE UP (ref 27–31)
MCHC RBC-ENTMCNC: 32.4 G/DL — SIGNIFICANT CHANGE UP (ref 32–37)
MCV RBC AUTO: 93.1 FL — SIGNIFICANT CHANGE UP (ref 80–94)
MONOCYTES # BLD AUTO: 0.75 K/UL — HIGH (ref 0.1–0.6)
MONOCYTES NFR BLD AUTO: 10 % — HIGH (ref 1.7–9.3)
NEUTROPHILS # BLD AUTO: 4.59 K/UL — SIGNIFICANT CHANGE UP (ref 1.4–6.5)
NEUTROPHILS NFR BLD AUTO: 61.5 % — SIGNIFICANT CHANGE UP (ref 42.2–75.2)
NRBC # BLD: 0 /100 WBCS — SIGNIFICANT CHANGE UP (ref 0–0)
PLATELET # BLD AUTO: 225 K/UL — SIGNIFICANT CHANGE UP (ref 130–400)
PMV BLD: 10 FL — SIGNIFICANT CHANGE UP (ref 7.4–10.4)
POTASSIUM SERPL-MCNC: 4.3 MMOL/L — SIGNIFICANT CHANGE UP (ref 3.5–5)
POTASSIUM SERPL-SCNC: 4.3 MMOL/L — SIGNIFICANT CHANGE UP (ref 3.5–5)
PROT SERPL-MCNC: 6.7 G/DL — SIGNIFICANT CHANGE UP (ref 6–8)
RBC # BLD: 4.04 M/UL — LOW (ref 4.7–6.1)
RBC # FLD: 14.1 % — SIGNIFICANT CHANGE UP (ref 11.5–14.5)
SODIUM SERPL-SCNC: 129 MMOL/L — LOW (ref 135–146)
SPECIMEN SOURCE: SIGNIFICANT CHANGE UP
WBC # BLD: 7.47 K/UL — SIGNIFICANT CHANGE UP (ref 4.8–10.8)
WBC # FLD AUTO: 7.47 K/UL — SIGNIFICANT CHANGE UP (ref 4.8–10.8)

## 2024-11-30 PROCEDURE — 99233 SBSQ HOSP IP/OBS HIGH 50: CPT

## 2024-11-30 RX ORDER — DOXYCYCLINE HYCLATE 150 MG/1
100 TABLET, COATED ORAL EVERY 12 HOURS
Refills: 0 | Status: DISCONTINUED | OUTPATIENT
Start: 2024-11-30 | End: 2024-12-01

## 2024-11-30 RX ORDER — AMOXICILLIN/POTASSIUM CLAV 250-125 MG
1 TABLET ORAL
Refills: 0 | Status: DISCONTINUED | OUTPATIENT
Start: 2024-11-30 | End: 2024-11-30

## 2024-11-30 RX ORDER — CEFPODOXIME PROXETIL 100 MG/5ML
200 GRANULE, FOR SUSPENSION ORAL EVERY 12 HOURS
Refills: 0 | Status: DISCONTINUED | OUTPATIENT
Start: 2024-11-30 | End: 2024-12-01

## 2024-11-30 RX ADMIN — DOXYCYCLINE HYCLATE 100 MILLIGRAM(S): 150 TABLET, COATED ORAL at 17:29

## 2024-11-30 RX ADMIN — INSULIN GLARGINE 20 UNIT(S): 100 INJECTION, SOLUTION SUBCUTANEOUS at 21:17

## 2024-11-30 RX ADMIN — Medication 300 MILLIGRAM(S): at 05:09

## 2024-11-30 RX ADMIN — Medication 2: at 16:13

## 2024-11-30 RX ADMIN — METRONIDAZOLE 500 MILLIGRAM(S): 500 TABLET ORAL at 05:01

## 2024-11-30 RX ADMIN — CEFPODOXIME PROXETIL 200 MILLIGRAM(S): 100 GRANULE, FOR SUSPENSION ORAL at 17:29

## 2024-11-30 RX ADMIN — METOPROLOL TARTRATE 50 MILLIGRAM(S): 100 TABLET, FILM COATED ORAL at 05:01

## 2024-11-30 RX ADMIN — CEFEPIME 100 MILLIGRAM(S): 2 INJECTION, POWDER, FOR SOLUTION INTRAVENOUS at 05:00

## 2024-11-30 RX ADMIN — SODIUM CHLORIDE 75 MILLILITER(S): 9 INJECTION, SOLUTION INTRAMUSCULAR; INTRAVENOUS; SUBCUTANEOUS at 09:03

## 2024-11-30 RX ADMIN — Medication 25 GRAM(S): at 09:57

## 2024-11-30 RX ADMIN — Medication 2: at 09:04

## 2024-11-30 RX ADMIN — ENOXAPARIN SODIUM 40 MILLIGRAM(S): 30 INJECTION SUBCUTANEOUS at 09:03

## 2024-11-30 RX ADMIN — Medication 6: at 12:13

## 2024-11-30 NOTE — PROGRESS NOTE ADULT - SUBJECTIVE AND OBJECTIVE BOX
HODAN ROBYN  69y  [facility] 4B 019 A      Patient is a 69y old  Male who presents with a chief complaint of Left hallux wound (30 Nov 2024 10:34)      My note supersedes the resident's note      INTERVAL HPI/OVERNIGHT EVENTS:  no acute events overnight       REVIEW OF SYSTEMS:  CONSTITUTIONAL: No fever, weight loss, or fatigue  EYES: No eye pain, visual disturbances, or discharge  ENMT:  No difficulty hearing, tinnitus, vertigo; No sinus or throat pain  NECK: No pain or stiffness  BREASTS: No pain, masses, or nipple discharge  RESPIRATORY: No cough, wheezing, chills or hemoptysis; No shortness of breath  CARDIOVASCULAR: No chest pain, palpitations, dizziness, or leg swelling  GASTROINTESTINAL: No abdominal or epigastric pain. No nausea, vomiting, or hematemesis; No diarrhea or constipation. No melena or hematochezia.  GENITOURINARY: No dysuria, frequency, hematuria, or incontinence  NEUROLOGICAL: No headaches, memory loss, loss of strength, numbness, or tremors  SKIN: No itching, burning, rashes, or lesions   LYMPH NODES: No enlarged glands  ENDOCRINE: No heat or cold intolerance; No hair loss  MUSCULOSKELETAL: No joint pain or swelling; No muscle, back, or extremity pain  PSYCHIATRIC: No depression, anxiety, mood swings, or difficulty sleeping  HEME/LYMPH: No easy bruising, or bleeding gums  ALLERY AND IMMUNOLOGIC: No hives or eczema  FAMILY HISTORY:    T(C): 36 (11-30-24 @ 08:00), Max: 36.7 (11-29-24 @ 15:00)  HR: 66 (11-30-24 @ 08:00) (66 - 95)  BP: 115/80 (11-30-24 @ 08:00) (101/74 - 156/92)  RR: 18 (11-30-24 @ 00:04) (16 - 20)  SpO2: 98% (11-30-24 @ 08:00) (95% - 99%)  Wt(kg): --Vital Signs Last 24 Hrs  T(C): 36 (30 Nov 2024 08:00), Max: 36.7 (29 Nov 2024 15:00)  T(F): 96.8 (30 Nov 2024 08:00), Max: 98.1 (29 Nov 2024 15:00)  HR: 66 (30 Nov 2024 08:00) (66 - 95)  BP: 115/80 (30 Nov 2024 08:00) (101/74 - 156/92)  BP(mean): 95 (29 Nov 2024 19:18) (95 - 95)  RR: 18 (30 Nov 2024 00:04) (16 - 20)  SpO2: 98% (30 Nov 2024 08:00) (95% - 99%)    Parameters below as of 30 Nov 2024 00:04  Patient On (Oxygen Delivery Method): room air        PHYSICAL EXAM:  GENERAL: NAD,   HEAD:  Atraumatic, Normocephalic  EYES: EOMI, PERRLA, conjunctiva and sclera clear  ENMT: No tonsillar erythema, exudates, or enlargement; Moist mucous membranes, Good dentition, No lesions  NECK: Supple, No JVD, Normal thyroid  NERVOUS SYSTEM:  Alert & Oriented X3  PULM: Clear to auscultation bilaterally  CARDIAC: Regular rate and rhythm; No murmurs, rubs, or gallops  GI: Soft, Nontender, Nondistended; Bowel sounds present  EXTREMITIES: L foot wrapped   LYMPH: No lymphadenopathy noted  SKIN: No rashes or lesions    Consultant(s) Notes Reviewed:  [x ] YES  [ ] NO  Care Discussed with Consultants/Other Providers [ x] YES  [ ] NO    LABS:                            12.2   7.47  )-----------( 225      ( 30 Nov 2024 07:19 )             37.6   11-30    129[L]  |  94[L]  |  23[H]  ----------------------------<  168[H]  4.3   |  24  |  0.7    Ca    9.3      30 Nov 2024 07:19  Mg     1.7     11-30    TPro  6.7  /  Alb  3.0[L]  /  TBili  1.0  /  DBili  x   /  AST  27  /  ALT  17  /  AlkPhos  72  11-30            acetaminophen     Tablet .. 650 milliGRAM(s) Oral every 6 hours PRN  aluminum hydroxide/magnesium hydroxide/simethicone Suspension 30 milliLiter(s) Oral every 4 hours PRN  cefepime   IVPB 2000 milliGRAM(s) IV Intermittent every 8 hours  dextrose 5%. 1000 milliLiter(s) IV Continuous <Continuous>  enoxaparin Injectable 40 milliGRAM(s) SubCutaneous every 24 hours  glucagon  Injectable 1 milliGRAM(s) IntraMuscular once  insulin glargine Injectable (LANTUS) 20 Unit(s) SubCutaneous at bedtime  insulin lispro (ADMELOG) corrective regimen sliding scale   SubCutaneous three times a day before meals  melatonin 3 milliGRAM(s) Oral at bedtime PRN  metoprolol succinate ER 50 milliGRAM(s) Oral daily  metroNIDAZOLE    Tablet 500 milliGRAM(s) Oral two times a day  ondansetron Injectable 4 milliGRAM(s) IV Push every 8 hours PRN  sodium chloride 0.9%. 1000 milliLiter(s) IV Continuous <Continuous>  vancomycin  IVPB 1500 milliGRAM(s) IV Intermittent every 12 hours      HEALTH ISSUES - PROBLEM Dx:          Case Discussed with House Staff   45 minutes spent on total encounter; more than 50% of the visit was spent counseling and/or coordinating care by the attending physician.    Spectra [phone]

## 2024-11-30 NOTE — PROGRESS NOTE ADULT - SUBJECTIVE AND OBJECTIVE BOX
ROBYN PETTIT 69y Male  MRN#: [id number]     Hospital Day: 4d    SUBJECTIVE     No overnight events. Patient seen and evaluated at bedside reports feeling well, has no acute complaints.                                             ----------------------------------------------------------  OBJECTIVE  PAST MEDICAL & SURGICAL HISTORY  HTN (hypertension)    Diabetic on diet only    Cirrhosis    CAD (coronary artery disease)    Anemia of chronic disorder    Status post cardiac revascularization with bypass aortocoronary anastomosis of five coronary vessels  double    History of cholecystectomy                                              -----------------------------------------------------------  ALLERGIES:  penicillin (Unknown)  tetanus toxoid (Unknown)                                            ------------------------------------------------------------    HOME MEDICATIONS  Home Medications:  furosemide 20 mg oral tablet: 1 tab(s) orally 3 times a week Monday, Wednesday, Friday (26 Nov 2024 23:38)  MetFORMIN (Eqv-Fortamet) 500 mg oral tablet, extended release: 1 tab(s) orally 2 times a day (26 Nov 2024 22:55)  spironolactone 25 mg oral tablet: 1 tab(s) orally once a day (26 Nov 2024 23:38)  Toprol-XL 50 mg oral tablet, extended release: 1 tab(s) orally (26 Nov 2024 22:57)                           MEDICATIONS:  STANDING MEDICATIONS  amoxicillin  875 milliGRAM(s)/clavulanate 1 Tablet(s) Oral two times a day  dextrose 5%. 1000 milliLiter(s) IV Continuous <Continuous>  enoxaparin Injectable 40 milliGRAM(s) SubCutaneous every 24 hours  glucagon  Injectable 1 milliGRAM(s) IntraMuscular once  insulin glargine Injectable (LANTUS) 20 Unit(s) SubCutaneous at bedtime  insulin lispro (ADMELOG) corrective regimen sliding scale   SubCutaneous three times a day before meals  metoprolol succinate ER 50 milliGRAM(s) Oral daily  sodium chloride 0.9%. 1000 milliLiter(s) IV Continuous <Continuous>    PRN MEDICATIONS  acetaminophen     Tablet .. 650 milliGRAM(s) Oral every 6 hours PRN  aluminum hydroxide/magnesium hydroxide/simethicone Suspension 30 milliLiter(s) Oral every 4 hours PRN  melatonin 3 milliGRAM(s) Oral at bedtime PRN  ondansetron Injectable 4 milliGRAM(s) IV Push every 8 hours PRN                                            ------------------------------------------------------------  VITAL SIGNS: Last 24 Hours  T(C): 36 (30 Nov 2024 08:00), Max: 36.7 (29 Nov 2024 15:00)  T(F): 96.8 (30 Nov 2024 08:00), Max: 98.1 (29 Nov 2024 15:00)  HR: 66 (30 Nov 2024 08:00) (66 - 95)  BP: 115/80 (30 Nov 2024 08:00) (101/74 - 156/92)  BP(mean): 95 (29 Nov 2024 19:18) (95 - 95)  RR: 18 (30 Nov 2024 00:04) (16 - 20)  SpO2: 98% (30 Nov 2024 08:00) (95% - 99%)      11-30-24 @ 07:01  -  11-30-24 @ 12:09  --------------------------------------------------------  IN: 375 mL / OUT: 0 mL / NET: 375 mL                                             --------------------------------------------------------------  LABS:                        12.2   7.47  )-----------( 225      ( 30 Nov 2024 07:19 )             37.6     11-30    129[L]  |  94[L]  |  23[H]  ----------------------------<  168[H]  4.3   |  24  |  0.7    Ca    9.3      30 Nov 2024 07:19  Mg     1.7     11-30    TPro  6.7  /  Alb  3.0[L]  /  TBili  1.0  /  DBili  x   /  AST  27  /  ALT  17  /  AlkPhos  72  11-30            PHYSICAL EXAM:  GENERAL: NAD, lying in bed comfortably  NECK: Supple, No JVD  CHEST/LUNG: Clear to auscultation bilaterally. Unlabored respirations  HEART: regular rate and rhythm; No murmurs  ABDOMEN: Soft, Nontender, Nondistended.    EXTREMITIES: Warm. No edema  NERVOUS SYSTEM:  Alert & Oriented X3. No focal deficits                                              --------------------------------------------------------------

## 2024-11-30 NOTE — PROGRESS NOTE ADULT - ASSESSMENT
Assessment:     69-year-old male PMH diabetes, cirrhosis, CAD s/p CABG 8 years ago who presents for left great toe swelling and erythema and discoloration over the past 6 days.  Was evaluated by Dr. Rodríguez of podiatry and sent here for IV ABx and podiatry evaluation. In the ED: BHB 1.5, lactate 3, Glucose 430 AG 17. Patient admitted to medicine for management of DKA & DFU.    Plan:     #Sepsis POA 2/2 DFU  - ID consulted  - C/W cefepime for now   - MRI foot negative for OM  - Podiatry following: Plan for left hallux amputation today     #DKA POA; (BHB 1.5, AG 17, Glucose 430) Resolved   - AG closed-> currently 12  - Lantus 20 units at bedtime, ADMELOG 7 units pre-meal  - C/W ISS & FS  - Will continue to monitor glucose closely.     #Hyponatremia ISO hyperglycemia   - Improving, 128 today   - Will continue to monitor     #DVT PPX- Lovenox  #Diet- consistent carbohydrates   #Dispo- med/surg    #Hand-off: Left hallux amputation today.      
HPI:  69-year-old male PMH diabetes, cirrhosis, CAD s/p CABG 8 years ago who presents for left great toe swelling and erythema and discoloration over the past 6 days.  Was evaluated by Dr. Rodríguez of podiatry and sent here for IV ABx and podiatry evaluation.  Denies any known trauma to the area.  Reports normal range of motion to the area. Has some reduced sensation Denies any fevers, chills, chest pain, shortness of breath, palpitations, cough, congestion, abdominal pain, NVDC, dysuria, purulent drainage, headache. Quit smoking 8 years ago, beforehand about  pack years, drinking beer regularly about 2 a day, no drugs or marijuana.       # Left great toe swelling and erythema secondary to infected diabetic foot ulcer   cefepime , flagyl , vancomycin   MRI showing no osteomyelitis   pod and id follow up     #DIabetes   CAPILLARY BLOOD GLUCOSE      POCT Blood Glucose.: 229 mg/dL (27 Nov 2024 20:50)  POCT Blood Glucose.: 391 mg/dL (27 Nov 2024 16:49)  adjust yesterday monitor    # CAD sp CABG     #Pseudohyponatremia monitor     # Hypomagnesemia repleted     #Drug monitoring of high risk medication , potential for drug drug reaction , requiring close monitoring check vanco trough pre 4th dose     PROGRESS NOTE HANDOFF      Pending:  ID / Pod follow up     Family discussion: patient verbalized understanding and agreeable to plan of care     Disposition: Home   
Assessment:     69-year-old male PMH diabetes, cirrhosis, CAD s/p CABG 8 years ago who presents for left great toe swelling and erythema and discoloration over the past 6 days.  Was evaluated by Dr. Rodríguez of podiatry and sent here for IV ABx and podiatry evaluation. In the ED: BHB 1.5, lactate 3, Glucose 430 AG 17. Patient admitted to medicine for management of DKA & DFU.    Plan:     #Sepsis POA 2/2 DFU  - ID consulted  - C/W cefepime for now   - MRI foot negative for OM  - S/P left hallux amputation 11/29  - Can D/C on Augmentin     #DKA POA; (BHB 1.5, AG 17, Glucose 430) Resolved   - AG closed-> currently 12  - Lantus 20 units at bedtime, ADMELOG 7 units pre-meal  - C/W ISS & FS  - Will continue to monitor glucose closely  - Needs endo f/u     #Hyponatremia ISO hyperglycemia   - Improving, 129 today   - Will continue to monitor     #DVT PPX- Lovenox  #Diet- consistent carbohydrates   #Dispo- med/surg    #Hand-off: Endocrine consult.     
Assessment:     69-year-old male PMH diabetes, cirrhosis, CAD s/p CABG 8 years ago who presents for left great toe swelling and erythema and discoloration over the past 6 days.  Was evaluated by Dr. Rodríguez of podiatry and sent here for IV ABx and podiatry evaluation. In the ED: BHB 1.5, lactate 3, Glucose 430 AG 17. Patient admitted to medicine for management of DKA & DFU.    Plan:     #DKA POA; (BHB 1.5, AG 17, Glucose 430)   - AG closed-> currently 12  - Lantus 20 units at bedtime, ADMELOG 7 units pre-meal  - C/W ISS & FS  - Will continue to monitor glucose closely.     #Sepsis POA 2/2 DFU  - ID consulted  - C/W cefepime for now   - F/U MRI foot   - F/U LE duplex   - Podiatry following: Possible Surgical Debridement; Pending Arterial Duplex, ESR/CRP and XR Imaging results.    #Hyponatremia ISO hyperglycemia   - Corrected Na+ is 130  - Will continue to monitor     #DVT PPX- lovenox  #Diet- consistent carbonydrates   #Dispo- med/surg    #Hand-off: Pending foot MRI, podiatry recs, ID recs.     
HPI:  69-year-old male PMH diabetes, cirrhosis, CAD s/p CABG 8 years ago who presents for left great toe swelling and erythema and discoloration over the past 6 days.  Was evaluated by Dr. Rodríguez of podiatry and sent here for IV ABx and podiatry evaluation.  Denies any known trauma to the area.  Reports normal range of motion to the area. Has some reduced sensation Denies any fevers, chills, chest pain, shortness of breath, palpitations, cough, congestion, abdominal pain, NVDC, dysuria, purulent drainage, headache. Quit smoking 8 years ago, beforehand about  pack years, drinking beer regularly about 2 a day, no drugs or marijuana.       # Left great toe swelling and erythema secondary to infected diabetic foot ulcer   cefepime , flagyl , vancomycin   MRI showing no osteomyelitis   pod intervention today     #Diabetes   POCT Blood Glucose.: 218 mg/dL (29 Nov 2024 07:37)  POCT Blood Glucose.: 162 mg/dL (28 Nov 2024 21:07)  POCT Blood Glucose.: 166 mg/dL (28 Nov 2024 17:28)  POCT Blood Glucose.: 200 mg/dL (28 Nov 2024 11:15)  POCT Blood Glucose.: 289 mg/dL (28 Nov 2024 08:46)  monitor     # CAD sp CABG     #Pseudohyponatremia monitor     # Hypomagnesemia repleted , improved , awaiting am labs     #Drug monitoring of high risk medication , potential for drug drug reaction , requiring close monitoring Vancomycin Level, Random: 15.9:      #Medical clearance chest xray ordered , ekg shows sinus rhythm , if chest xray within normal limits - >  naylor criteria class 2 - moderate risk for moderate procedure       PROGRESS NOTE HANDOFF      Pending: chest xray , operative intervention     Family discussion: patient verbalized understanding and agreeable to plan of care     Disposition: Home   
HPI:  69-year-old male PMH diabetes, cirrhosis, CAD s/p CABG 8 years ago who presents for left great toe swelling and erythema and discoloration over the past 6 days.  Was evaluated by Dr. Rodríguez of podiatry and sent here for IV ABx and podiatry evaluation.  Denies any known trauma to the area.  Reports normal range of motion to the area. Has some reduced sensation Denies any fevers, chills, chest pain, shortness of breath, palpitations, cough, congestion, abdominal pain, NVDC, dysuria, purulent drainage, headache. Quit smoking 8 years ago, beforehand about  pack years, drinking beer regularly about 2 a day, no drugs or marijuana.       # Left great toe swelling and erythema secondary to infected diabetic foot ulcer   sp Amputation of toe of left foot  augmentin  q12h x 7days     #Diabetes   POCT Blood Glucose.: 218 mg/dL (29 Nov 2024 07:37)  POCT Blood Glucose.: 162 mg/dL (28 Nov 2024 21:07)  POCT Blood Glucose.: 166 mg/dL (28 Nov 2024 17:28)  POCT Blood Glucose.: 200 mg/dL (28 Nov 2024 11:15)  POCT Blood Glucose.: 289 mg/dL (28 Nov 2024 08:46)  better controlled - endo consult, attempted to contact PMD DR Copeland on cell phone no answer     # CAD sp CABG     #Pseudohyponatremia improving     # Hypomagnesemia replete      PROGRESS NOTE HANDOFF      Pending: endo eval , dc within 24 hours     Family discussion: patient verbalized understanding and agreeable to plan of care , spoke with PMD who is requesting endo due to a1c to see before dc     Disposition: Home

## 2024-11-30 NOTE — PROGRESS NOTE ADULT - PROVIDER SPECIALTY LIST ADULT
Podiatry
Hospitalist
Internal Medicine
Podiatry
Podiatry
Internal Medicine
Internal Medicine

## 2024-11-30 NOTE — PROGRESS NOTE ADULT - SUBJECTIVE AND OBJECTIVE BOX
Podiatry Progress Note    Subjective:  ROBYN PETTIT is a  69y Male.   Seen bedside.   Patient is a 69y old  Male who presents with a chief complaint of L Hallux Ulcer (28 Nov 2024 11:25)      Past Medical History and Surgical History  PAST MEDICAL & SURGICAL HISTORY:  HTN (hypertension)      Diabetic on diet only      Cirrhosis      CAD (coronary artery disease)      Anemia of chronic disorder      Status post cardiac revascularization with bypass aortocoronary anastomosis of five coronary vessels  double      History of cholecystectomy           Objective:  Vital Signs Last 24 Hrs  T(C): 36 (30 Nov 2024 08:00), Max: 36.7 (29 Nov 2024 15:00)  T(F): 96.8 (30 Nov 2024 08:00), Max: 98.1 (29 Nov 2024 15:00)  HR: 66 (30 Nov 2024 08:00) (66 - 95)  BP: 115/80 (30 Nov 2024 08:00) (101/74 - 156/92)  BP(mean): 95 (29 Nov 2024 19:18) (95 - 95)  RR: 18 (30 Nov 2024 00:04) (16 - 20)  SpO2: 98% (30 Nov 2024 08:00) (95% - 99%)    Parameters below as of 30 Nov 2024 00:04  Patient On (Oxygen Delivery Method): room air                            12.2   7.47  )-----------( 225      ( 30 Nov 2024 07:19 )             37.6                 11-30    129[L]  |  94[L]  |  23[H]  ----------------------------<  168[H]  4.3   |  24  |  0.7    Ca    9.3      30 Nov 2024 07:19  Mg     1.7     11-30    TPro  6.7  /  Alb  3.0[L]  /  TBili  1.0  /  DBili  x   /  AST  27  /  ALT  17  /  AlkPhos  72  11-30        Physical Exam - Lower Extremity Focused: LLE  Derm: left hallux surgical site. sutures intact. skin well co-apted. No dehiscence. no drainage. mild surrounding erythema no streaking proximally. No malodor.     Vascular: DP and PT Pulses palpable; Foot is Warm to the touch; Capillary Refill Time < 3 Seconds    Neuro: Protective Sensation Diminished / Moderately Neuropathic     MSK: -    Assessment:  Left hallux amputation (11/29/24)  Osteomyelitis     Plan:  Chart reviewed and Patient evaluated. All Questions and Concerns Addressed and Answered  Local Wound Care; betadine, adaptic, 4x4, kerlix, ace  Weight Bearing Status; WBAT    No further dressing changes required  No Further Sx Debridement  Final ID po augmentin [id number] bid 7 days  Patient Stable Per Podiatry Standpoint; Follow Up as an Outpatient w/ Dr. Rodríguez  Discussed Plan w/ Attending     Podiatry   Please message on teams for further questions

## 2024-11-30 NOTE — CONSULT NOTE ADULT - ASSESSMENT
69-year-old male PMH diabetes, cirrhosis, CAD s/p CABG 8 years ago who presents for left great toe swelling and erythema and discoloration over the past 6 days.    #Diabetes MEllitus  - A1c > 15  69-year-old male PMH diabetes, cirrhosis, CAD s/p CABG 8 years ago who presents for left great toe swelling and erythema and discoloration over the past 6 days.    #Diabetes MEllitus  - A1c > 15   - Blood glucose ranging between 1 60-2 50 inpatient  -Recommend continuing basal insulin 20 units can start prandial insulin standing 5 units 3 times a day with a sliding scale of 1 unit for 50 mg/dL above 150 mg/dL  -Has a history of cirrhosis being monitored outpatient, states that he has been losing weight but has been trying to do the same also had polyuria polydipsia before the admission  -Recommend to obtain a C-peptide with serum glucose  -Considering A1c and history of cirrhosis might be safest to discharge patient on basal bolus insulin , recommend to continue with Lantus 20 units once a day and prandial insulin lispro 5 units three times a day , please provide Lantus and lispro pens glucometer and testing supplies, will benefit from a CGM outpatient, can DC metformin   -Can follow-up at [address] [phone] 69-year-old male PMH diabetes, cirrhosis, CAD s/p CABG 8 years ago who presents for left great toe swelling and erythema and discoloration over the past 6 days.    #Diabetes MEllitus  - A1c > 15   - Blood glucose ranging between 1 60-2 50 inpatient  -Recommend continuing basal insulin 20 units can start prandial insulin standing 5 units 3 times a day with a sliding scale of 1 unit for 50 mg/dL above 150 mg/dL  -Has a history of cirrhosis being monitored outpatient, states that he has been losing weight but has been trying to do the same also had polyuria polydipsia before the admission  -Recommend to obtain a C-peptide with serum glucose  -Considering A1c and history of cirrhosis might be safest to discharge patient on basal bolus insulin , recommend to continue with Lantus 20 units once a day and prandial insulin lispro 5 units before meals (states he only eats once or twice a day ), please provide Lantus and lispro pens glucometer and testing supplies, will benefit from a CGM outpatient, can DC metformin will avoid orals for now  -Can follow-up at [address] [phone]

## 2024-11-30 NOTE — CONSULT NOTE ADULT - SUBJECTIVE AND OBJECTIVE BOX
69-year-old male PMH diabetes, cirrhosis, CAD s/p CABG 8 years ago who presents for left great toe swelling and erythema and discoloration over the past 6 days.     Today is hospital day 4d. This morning patient was seen and examined at bedside, resting comfortably in bed.    No acute or major events overnight.    PAST MEDICAL & SURGICAL HISTORY  HTN (hypertension)    Diabetic on diet only    Cirrhosis    CAD (coronary artery disease)    Anemia of chronic disorder    Status post cardiac revascularization with bypass aortocoronary anastomosis of five coronary vessels  double    History of cholecystectomy      SOCIAL HISTORY:  Social History:      ALLERGIES:  penicillin (Unknown)  tetanus toxoid (Unknown)    MEDICATIONS:  STANDING MEDICATIONS  amoxicillin  875 milliGRAM(s)/clavulanate 1 Tablet(s) Oral two times a day  dextrose 5%. 1000 milliLiter(s) IV Continuous <Continuous>  enoxaparin Injectable 40 milliGRAM(s) SubCutaneous every 24 hours  glucagon  Injectable 1 milliGRAM(s) IntraMuscular once  insulin glargine Injectable (LANTUS) 20 Unit(s) SubCutaneous at bedtime  insulin lispro (ADMELOG) corrective regimen sliding scale   SubCutaneous three times a day before meals  metoprolol succinate ER 50 milliGRAM(s) Oral daily  sodium chloride 0.9%. 1000 milliLiter(s) IV Continuous <Continuous>    PRN MEDICATIONS  acetaminophen     Tablet .. 650 milliGRAM(s) Oral every 6 hours PRN  aluminum hydroxide/magnesium hydroxide/simethicone Suspension 30 milliLiter(s) Oral every 4 hours PRN  melatonin 3 milliGRAM(s) Oral at bedtime PRN  ondansetron Injectable 4 milliGRAM(s) IV Push every 8 hours PRN    VITALS:   T(F): 96.8  HR: 66  BP: 115/80  RR: 18  SpO2: 98%    PHYSICAL EXAM:  GENERAL: NAD, well-groomed, well-developed  HEAD:  Atraumatic, Normocephalic  EYES: EOMI  NECK: Supple  NERVOUS SYSTEM:  Alert & Oriented X3, non focal   CHEST/LUNG: Clear to auscultation bilaterally; No rales, rhonchi, wheezing, or rubs  HEART: Regular rate and rhythm; No murmurs, rubs, or gallops  ABDOMEN: Soft, Nontender, Nondistended; Bowel sounds present  EXTREMITIES:  2+ Peripheral Pulses, No clubbing, cyanosis, or edema  LYMPH: No lymphadenopathy noted  SKIN: No rashes or lesions  LABS:                        12.2   7.47  )-----------( 225      ( 30 Nov 2024 07:19 )             37.6     11-30    129[L]  |  94[L]  |  23[H]  ----------------------------<  168[H]  4.3   |  24  |  0.7    Ca    9.3      30 Nov 2024 07:19  Mg     1.7     11-30    TPro  6.7  /  Alb  3.0[L]  /  TBili  1.0  /  DBili  x   /  AST  27  /  ALT  17  /  AlkPhos  72  11-30      Urinalysis Basic - ( 30 Nov 2024 07:19 )    Color: x / Appearance: x / SG: x / pH: x  Gluc: 168 mg/dL / Ketone: x  / Bili: x / Urobili: x   Blood: x / Protein: x / Nitrite: x   Leuk Esterase: x / RBC: x / WBC x   Sq Epi: x / Non Sq Epi: x / Bacteria: x                RADIOLOGY:

## 2024-12-01 VITALS
DIASTOLIC BLOOD PRESSURE: 75 MMHG | OXYGEN SATURATION: 100 % | HEART RATE: 68 BPM | TEMPERATURE: 98 F | SYSTOLIC BLOOD PRESSURE: 127 MMHG

## 2024-12-01 LAB
ANION GAP SERPL CALC-SCNC: 9 MMOL/L — SIGNIFICANT CHANGE UP (ref 7–14)
BUN SERPL-MCNC: 22 MG/DL — HIGH (ref 10–20)
CALCIUM SERPL-MCNC: 9.5 MG/DL — SIGNIFICANT CHANGE UP (ref 8.4–10.5)
CHLORIDE SERPL-SCNC: 97 MMOL/L — LOW (ref 98–110)
CO2 SERPL-SCNC: 25 MMOL/L — SIGNIFICANT CHANGE UP (ref 17–32)
CREAT SERPL-MCNC: 0.6 MG/DL — LOW (ref 0.7–1.5)
EGFR: 104 ML/MIN/1.73M2 — SIGNIFICANT CHANGE UP
GLUCOSE BLDC GLUCOMTR-MCNC: 135 MG/DL — HIGH (ref 70–99)
GLUCOSE BLDC GLUCOMTR-MCNC: 216 MG/DL — HIGH (ref 70–99)
GLUCOSE SERPL-MCNC: 125 MG/DL — HIGH (ref 70–99)
GRAM STN FLD: ABNORMAL
POTASSIUM SERPL-MCNC: 4.4 MMOL/L — SIGNIFICANT CHANGE UP (ref 3.5–5)
POTASSIUM SERPL-SCNC: 4.4 MMOL/L — SIGNIFICANT CHANGE UP (ref 3.5–5)
SODIUM SERPL-SCNC: 131 MMOL/L — LOW (ref 135–146)

## 2024-12-01 PROCEDURE — 99239 HOSP IP/OBS DSCHRG MGMT >30: CPT

## 2024-12-01 RX ORDER — INSULIN GLARGINE 100 [IU]/ML
20 INJECTION, SOLUTION SUBCUTANEOUS
Qty: 2 | Refills: 0
Start: 2024-12-01 | End: 2024-12-30

## 2024-12-01 RX ORDER — INSULIN GLARGINE 100 [IU]/ML
20 INJECTION, SOLUTION SUBCUTANEOUS
Qty: 10 | Refills: 0
Start: 2024-12-01

## 2024-12-01 RX ORDER — ISOPROPYL ALCOHOL, BENZOCAINE .7; .06 ML/ML; ML/ML
0 SWAB TOPICAL
Qty: 90 | Refills: 3
Start: 2024-12-01

## 2024-12-01 RX ORDER — DOXYCYCLINE HYCLATE 150 MG/1
1 TABLET, COATED ORAL
Qty: 14 | Refills: 0
Start: 2024-12-01 | End: 2024-12-07

## 2024-12-01 RX ORDER — CEFPODOXIME PROXETIL 100 MG/5ML
1 GRANULE, FOR SUSPENSION ORAL
Qty: 14 | Refills: 0
Start: 2024-12-01 | End: 2024-12-07

## 2024-12-01 RX ORDER — NICOTINE POLACRILEX 4 MG/1
1 GUM, CHEWING ORAL
Qty: 2 | Refills: 0
Start: 2024-12-01 | End: 2025-01-29

## 2024-12-01 RX ORDER — ISOPROPYL ALCOHOL, BENZOCAINE .7; .06 ML/ML; ML/ML
0 SWAB TOPICAL
Qty: 90 | Refills: 0
Start: 2024-12-01

## 2024-12-01 RX ADMIN — CEFPODOXIME PROXETIL 200 MILLIGRAM(S): 100 GRANULE, FOR SUSPENSION ORAL at 05:01

## 2024-12-01 RX ADMIN — METOPROLOL TARTRATE 50 MILLIGRAM(S): 100 TABLET, FILM COATED ORAL at 05:02

## 2024-12-01 RX ADMIN — Medication 4: at 11:44

## 2024-12-01 RX ADMIN — DOXYCYCLINE HYCLATE 100 MILLIGRAM(S): 150 TABLET, COATED ORAL at 05:02

## 2024-12-01 NOTE — DISCHARGE NOTE PROVIDER - CARE PROVIDER_API CALL
Mendel Copeland  Internal Medicine  [address] [place] [address]  Phone: [phone]  Fax: [phone]  Follow Up Time:     Bill Storm  Endocrinology/Metab/Diabetes  [address] [place] [address]  Phone: [phone]  Fax: [phone]  Follow Up Time:     Barak Rodríguez  Podiatric Medicine and Surgery  [address] [place] [address]  Phone: [phone]  Fax: [phone]  Follow Up Time:

## 2024-12-01 NOTE — DISCHARGE NOTE PROVIDER - NSDCFUSCHEDAPPT_GEN_ALL_CORE_FT
Ethan Cho  [facility] Physician [facility]  CARDIOLOGY [address] [place] [address]  Scheduled Appointment: 01/13/2025

## 2024-12-01 NOTE — DISCHARGE NOTE PROVIDER - CARE PROVIDERS DIRECT ADDRESSES
,DirectAddress_Unknown,[email or web address],DirectAddress_Unknown

## 2024-12-01 NOTE — DISCHARGE NOTE PROVIDER - ATTENDING DISCHARGE PHYSICAL EXAMINATION:
GEN Lying in no acute distress  HEENT Pupils equal and reactive to light and accommodationSupple Neck  PULM Clear to auscultation bilaterally  CV s1s2 regular rate and rhythm  GI + bowel sounds nontnender  EXT no cyanosis or edema  PSYCH awake alert and oriented x 3  INTEG No Lesions  NEURO Moves all extremities

## 2024-12-01 NOTE — DISCHARGE NOTE PROVIDER - HOSPITAL COURSE
The patient is a 69-year-old male with a past medical history of diabetes, cirrhosis, and coronary artery disease status post CABG 8 years ago. He presented with a 6-day history of swelling, erythema, and discoloration of the left great toe. The patient was evaluated by Dr. Rodríguez from Podiatry and was admitted for intravenous antibiotics and further podiatric evaluation.    During the hospital stay, it was determined that the patient had an infected diabetic foot ulcer. Due to the severity of the infection, a decision was made to proceed with the amputation of the left great toe. Post-operatively, the patient was started on cefpodoxime and doxycycline for infection control.    The patient's diabetes was managed with monitoring of blood glucose levels, which showed fluctuations. The endocrinology team recommended discharge with insulin therapy to better manage his diabetes.    The patient also presented with pseudohyponatremia, which showed improvement during the hospital stay, and hypomagnesemia, which was successfully repleted.    Medications on Discharge:    Cefpodoxime - as prescribed  Doxycycline - as prescribed  Insulin - as recommended by endocrinology  Follow-Up:    Follow up with Dr. Rodríguez in Podiatry within one week for post-operative evaluation.  Endocrinology follow-up to monitor and adjust insulin therapy as needed.  Primary care follow-up to manage ongoing health conditions and monitor for any signs of infection or complications.  Discharge Instructions:    Monitor the surgical site for any signs of infection, such as increased redness, swelling, or discharge.  Maintain good glycemic control and adhere to the prescribed insulin regimen.  Avoid alcohol consumption and maintain a healthy diet to support overall health and healing.  Report any new symptoms or concerns to your healthcare provider promptly.  The patient was stable at the time of discharge and understood the importance of follow-up care and adherence to the discharge plan.

## 2024-12-01 NOTE — DISCHARGE NOTE PROVIDER - PROVIDER TOKENS
PROVIDER:[TOKEN:[[id number]]],PROVIDER:[TOKEN:[[id number]]],PROVIDER:[TOKEN:[[id number]]]

## 2024-12-01 NOTE — DISCHARGE NOTE PROVIDER - NSDCCPCAREPLAN_GEN_ALL_CORE_FT
PRINCIPAL DISCHARGE DIAGNOSIS  Diagnosis: Infection of great toe  Assessment and Plan of Treatment: status post operative infeciton , cotninue with antibitoics and follow up with podiatry , endocrinology and primary care physician

## 2024-12-01 NOTE — DISCHARGE NOTE PROVIDER - NSFTFHOMEHTHYNRD_GEN_ALL_CORE
Yes Orbicularis Oris Muscle Flap Text: The defect edges were debeveled with a #15 scalpel blade.  Given that the defect affected the competency of the oral sphincter an orbicularis oris muscle flap was deemed most appropriate to restore this competency and normal muscle function.  Using a sterile surgical marker, an appropriate flap was drawn incorporating the defect. The area thus outlined was incised with a #15 scalpel blade.

## 2024-12-01 NOTE — DISCHARGE NOTE PROVIDER - NSDCMRMEDTOKEN_GEN_ALL_CORE_FT
alcohol swabs: alcohol swabs for insulin injection  cefpodoxime 200 mg oral tablet: 1 tab(s) orally every 12 hours  doxycycline hyclate 100 mg oral tablet: 1 tab(s) orally 2 times a day  furosemide 20 mg oral tablet: 1 tab(s) orally 3 times a week Monday, Wednesday, Friday  glucometer: glucometer for diabetes  HumaLOG KwikPen 200 units/mL (Concentrated) subcutaneous solution: 5 subcutaneous 3 times a day  lancets: one package  lancets: for glucometer  Lantus 100 units/mL subcutaneous solution: 20 unit(s) subcutaneous once a day (at bedtime)  spironolactone 25 mg oral tablet: 1 tab(s) orally once a day  synringes for insulin: to be used with insulin 1 month supply  Toprol-XL 50 mg oral tablet, extended release: 1 tab(s) orally once a day   alcohol swabs: alcohol swabs for insulin injection  cefpodoxime 200 mg oral tablet: 1 tab(s) orally every 12 hours  doxycycline hyclate 100 mg oral tablet: 1 tab(s) orally 2 times a day  furosemide 20 mg oral tablet: 1 tab(s) orally 3 times a week Monday, Wednesday, Friday  glucometer: glucometer for diabetes  HumaLOG KwikPen 200 units/mL (Concentrated) subcutaneous solution: 5 subcutaneous 3 times a day  lancets: one package  lancets: for glucometer  Lantus 100 units/mL subcutaneous solution: 20 unit(s) subcutaneous once a day (at bedtime)  spironolactone 25 mg oral tablet: 1 tab(s) orally once a day  synringes for insulin: to be used with insulin 1 month supply  test strips: one box  Toprol-XL 50 mg oral tablet, extended release: 1 tab(s) orally once a day

## 2024-12-01 NOTE — DISCHARGE NOTE NURSING/CASE MANAGEMENT/SOCIAL WORK - FINANCIAL ASSISTANCE
[facility] provides services at a reduced cost to those who are determined to be eligible through [facility]’s financial assistance program. Information regarding [facility]’s financial assistance program can be found by going to [email or web address] or by calling [phone].

## 2024-12-01 NOTE — DISCHARGE NOTE NURSING/CASE MANAGEMENT/SOCIAL WORK - PATIENT PORTAL LINK FT
You can access the FollowMyHealth Patient Portal offered by [facility] by registering at the following website: [email or web address]. By joining [facility]’s FollowMyHealth portal, you will also be able to view your health information using other applications (apps) compatible with our system.

## 2024-12-02 LAB
CULTURE RESULTS: SIGNIFICANT CHANGE UP
CULTURE RESULTS: SIGNIFICANT CHANGE UP
SPECIMEN SOURCE: SIGNIFICANT CHANGE UP
SPECIMEN SOURCE: SIGNIFICANT CHANGE UP

## 2024-12-04 LAB
-  CLINDAMYCIN: SIGNIFICANT CHANGE UP
-  ERYTHROMYCIN: SIGNIFICANT CHANGE UP
-  GENTAMICIN: SIGNIFICANT CHANGE UP
-  OXACILLIN: SIGNIFICANT CHANGE UP
-  RIFAMPIN: SIGNIFICANT CHANGE UP
-  TETRACYCLINE: SIGNIFICANT CHANGE UP
-  TRIMETHOPRIM/SULFAMETHOXAZOLE: SIGNIFICANT CHANGE UP
-  VANCOMYCIN: SIGNIFICANT CHANGE UP
METHOD TYPE: SIGNIFICANT CHANGE UP

## 2024-12-05 LAB
CULTURE RESULTS: ABNORMAL
ORGANISM # SPEC MICROSCOPIC CNT: ABNORMAL
ORGANISM # SPEC MICROSCOPIC CNT: SIGNIFICANT CHANGE UP
SPECIMEN SOURCE: SIGNIFICANT CHANGE UP

## 2024-12-09 LAB — SURGICAL PATHOLOGY STUDY: SIGNIFICANT CHANGE UP

## 2024-12-11 NOTE — CDI QUERY NOTE - NSCDIOTHERTXTBX_GEN_ALL_CORE_HH
There is inconsistent documentation in the medical record regarding the patient’s condition.      In order to ensure accurate coding and accuracy of the clinical record, the documentation in this patient’s medical record requires additional clarification.      The diagnosis of sepsis has been documented in the medical record:    Please review the documentation in the medical record and clarify the appropriate diagnosis (along with any supporting documentation) in your progress note and/or discharge summary.    • Sepsis has been ruled in at the time of discharge.  • Sepsis has been ruled out at the time of discharge.  • Other (please specify):    Supporting documentation and/or clinical evidence:     11/26 H&P Adult: ... Mild sepsis due to DM associated foot wound with cellulitis of the foot ... NS 1 L and then 75 cc/hr, if dry can get more ... blood cultures-cefepime ... ID and podiatry consults- may need debridement ...  Attestation Statements: ... Left diabetic foot ulcer ... no intervention by podiatry, id eval     11/27, 11/29, 11/30 Progress Note Adult-Internal Medicine Resident: ... Sepsis POA 2/2 DFU- ID consulted- C/W cefepime for now - MRI foot negative for OM- S/P left hallux amputation 11/29 11/27 Consult Note Adult-Infectious Disease Attending: ... Severe sepsis on admission P>90 WBC > 12 lactic acidosis due to L hallux DFU. Admission WBC 14. Sedimentation Rate, Erythrocyte: 98 mm/Hr (11-26-24 @ 17:57) C-Reactive Protein: 83.2 mg/L (11.26.24 @ 17:57) ... Lactic acidosis    11/28-11/30 Progress Note Adult-Hospitalist Attending: ... Left great toe swelling and erythema secondary to infected diabetic foot ulcer     12/1 Discharge Note Provider: ... PRINCIPAL DISCHARGE DIAGNOSIS: Infection of great toe. Assessment and Plan of Treatment: status post operative infeciton , cotninue with antibitoics and follow up with podiatry , endocrinology and primary care physician    Nursing VS Flowsheet: 11/26@2017 HR 87, @1429 HR 93; 11/27@0122 HR  96    Lab findings: 11/26 WBC-14.01, Lactate-3.0    Per MAR: Flagyl IVPB. Indication: foot infection. Administered 11/26                 Azactam IVPB. Indication: foot infection. Administered 11/26                 Cefepime IVPB. Indication: cellulitis, diabetic foot infection. Administered 11/26-11/30                 Vancomycin IVPB. Indication: Skin and soft tissue infection. Administered 11/27-11/29                 Doxycycline IVPB. Indication: DFU. Administered 11/30-12/1                 NaCl 0.9% Bolus 1000ml. Administered 11/26      Thank you,  Zoraida Dallas RN [facility] CCDS  [phone] There is inconsistent documentation in the medical record regarding the patient’s condition.      In order to ensure accurate coding and accuracy of the clinical record, the documentation in this patient’s medical record requires additional clarification.      The diagnosis of sepsis has been documented in the medical record:    Please review the documentation in the medical record and clarify the appropriate diagnosis (along with any supporting documentation) in your progress note and/or discharge summary.    Please clarify if the patient was found to have one of the following:     • Sepsis has been ruled in at the time of discharge.  • Sepsis has been ruled out at the time of discharge.  • Other (please specify):    Supporting documentation and/or clinical evidence:     11/26 H&P Adult: ... Mild sepsis due to DM associated foot wound with cellulitis of the foot ... NS 1 L and then 75 cc/hr, if dry can get more ... blood cultures-cefepime ... ID and podiatry consults- may need debridement ...  Attestation Statements: ... Left diabetic foot ulcer ... no intervention by podiatry, id eval     11/27, 11/29, 11/30 Progress Note Adult-Internal Medicine Resident: ... Sepsis POA 2/2 DFU- ID consulted- C/W cefepime for now - MRI foot negative for OM- S/P left hallux amputation 11/29 11/27 Consult Note Adult-Infectious Disease Attending: ... Severe sepsis on admission P>90 WBC > 12 lactic acidosis due to L hallux DFU. Admission WBC 14. Sedimentation Rate, Erythrocyte: 98 mm/Hr (11-26-24 @ 17:57) C-Reactive Protein: 83.2 mg/L (11.26.24 @ 17:57) ... Lactic acidosis    11/28-11/30 Progress Note Adult-Hospitalist Attending: ... Left great toe swelling and erythema secondary to infected diabetic foot ulcer     12/1 Discharge Note Provider: ... PRINCIPAL DISCHARGE DIAGNOSIS: Infection of great toe. Assessment and Plan of Treatment: status post operative infeciton , cotninue with antibitoics and follow up with podiatry , endocrinology and primary care physician    Nursing VS Flowsheet: 11/26@2017 HR 87, @2329 HR 93; 11/27@0122 HR  96    Lab findings: 11/26 WBC-14.01, Lactate-3.0    Per MAR: Flagyl IVPB. Indication: foot infection. Administered 11/26                 Azactam IVPB. Indication: foot infection. Administered 11/26                 Cefepime IVPB. Indication: cellulitis, diabetic foot infection. Administered 11/26-11/30                 Vancomycin IVPB. Indication: Skin and soft tissue infection. Administered 11/27-11/29                 Doxycycline IVPB. Indication: DFU. Administered 11/30-12/1                 NaCl 0.9% Bolus 1000ml. Administered 11/26      Thank you,  Zoraida Dallas RN [facility] CCDS  [phone]

## 2025-01-13 ENCOUNTER — NON-APPOINTMENT (OUTPATIENT)
Age: 70
End: 2025-01-13

## 2025-01-13 ENCOUNTER — APPOINTMENT (OUTPATIENT)
Dept: CARDIOLOGY | Facility: CLINIC | Age: 70
End: 2025-01-13
Payer: MEDICARE

## 2025-01-13 VITALS — BODY MASS INDEX: 29.52 KG/M2 | HEIGHT: 74 IN | WEIGHT: 230 LBS

## 2025-01-13 VITALS — SYSTOLIC BLOOD PRESSURE: 110 MMHG | DIASTOLIC BLOOD PRESSURE: 70 MMHG | RESPIRATION RATE: 18 BRPM | HEART RATE: 75 BPM

## 2025-01-13 DIAGNOSIS — Z95.1 PRESENCE OF AORTOCORONARY BYPASS GRAFT: ICD-10-CM

## 2025-01-13 DIAGNOSIS — E11.9 TYPE 2 DIABETES MELLITUS W/OUT COMPLICATIONS: ICD-10-CM

## 2025-01-13 DIAGNOSIS — I10 ESSENTIAL (PRIMARY) HYPERTENSION: ICD-10-CM

## 2025-01-13 DIAGNOSIS — I25.10 ATHEROSCLEROTIC HEART DISEASE OF NATIVE CORONARY ARTERY W/OUT ANGINA PECTORIS: ICD-10-CM

## 2025-01-13 DIAGNOSIS — I71.21 ANEURYSM OF THE ASCENDING AORTA, WITHOUT RUPTURE: ICD-10-CM

## 2025-01-13 PROCEDURE — 93000 ELECTROCARDIOGRAM COMPLETE: CPT

## 2025-01-13 PROCEDURE — 99214 OFFICE O/P EST MOD 30 MIN: CPT

## 2025-01-13 PROCEDURE — G2211 COMPLEX E/M VISIT ADD ON: CPT

## 2025-01-13 RX ORDER — INSULIN GLARGINE 100 [IU]/ML
100 INJECTION, SOLUTION SUBCUTANEOUS
Refills: 0 | Status: ACTIVE | COMMUNITY

## 2025-01-13 RX ORDER — INSULIN LISPRO 100 [IU]/ML
100 INJECTION, SOLUTION INTRAVENOUS; SUBCUTANEOUS
Refills: 0 | Status: ACTIVE | COMMUNITY

## 2025-01-21 ENCOUNTER — APPOINTMENT (OUTPATIENT)
Dept: ENDOCRINOLOGY | Facility: CLINIC | Age: 70
End: 2025-01-21

## 2025-01-27 ENCOUNTER — OUTPATIENT (OUTPATIENT)
Dept: OUTPATIENT SERVICES | Facility: HOSPITAL | Age: 70
LOS: 1 days | End: 2025-01-27
Payer: MEDICARE

## 2025-01-27 VITALS
HEIGHT: 74 IN | DIASTOLIC BLOOD PRESSURE: 87 MMHG | SYSTOLIC BLOOD PRESSURE: 125 MMHG | HEART RATE: 100 BPM | TEMPERATURE: 98 F | OXYGEN SATURATION: 96 % | RESPIRATION RATE: 16 BRPM | WEIGHT: 222.01 LBS

## 2025-01-27 DIAGNOSIS — Z01.818 ENCOUNTER FOR OTHER PREPROCEDURAL EXAMINATION: ICD-10-CM

## 2025-01-27 DIAGNOSIS — Z90.49 ACQUIRED ABSENCE OF OTHER SPECIFIED PARTS OF DIGESTIVE TRACT: Chronic | ICD-10-CM

## 2025-01-27 DIAGNOSIS — Z95.1 PRESENCE OF AORTOCORONARY BYPASS GRAFT: Chronic | ICD-10-CM

## 2025-01-27 DIAGNOSIS — M20.42 OTHER HAMMER TOE(S) (ACQUIRED), LEFT FOOT: ICD-10-CM

## 2025-01-27 PROCEDURE — 99214 OFFICE O/P EST MOD 30 MIN: CPT | Mod: 25

## 2025-01-27 NOTE — H&P PST ADULT - WEIGHT IN KG
100.7 Cheek-To-Nose Interpolation Flap Text: A decision was made to reconstruct the defect utilizing an interpolation axial flap and a staged reconstruction.  A telfa template was made of the defect.  This telfa template was then used to outline the Cheek-To-Nose Interpolation flap.  The donor area for the pedicle flap was then injected with anesthesia.  The flap was excised through the skin and subcutaneous tissue down to the layer of the underlying musculature.  The interpolation flap was carefully excised within this deep plane to maintain its blood supply.  The edges of the donor site were undermined.   The donor site was closed in a primary fashion.  The pedicle was then rotated into position and sutured.  Once the tube was sutured into place, adequate blood supply was confirmed with blanching and refill.  The pedicle was then wrapped with xeroform gauze and dressed appropriately with a telfa and gauze bandage to ensure continued blood supply and protect the attached pedicle.

## 2025-01-27 NOTE — H&P PST ADULT - NSICDXPASTMEDICALHX_GEN_ALL_CORE_FT
PAST MEDICAL HISTORY:  Anemia of chronic disorder     CAD (coronary artery disease)     Cirrhosis     DM (diabetes mellitus)     HTN (hypertension)

## 2025-01-27 NOTE — H&P PST ADULT - HISTORY OF PRESENT ILLNESS
Pt with hx of diabetic foot ulcer. Recently had left great toe amputated. States he has a small ulcer on second toe of left foot. Reports "I think this procedure is to help the bone and prevent further ulcers." He denies any other concerns. Now proceeding with above.    Denies any chest pain, difficulty breathing, SOB, palpitations, dysuria, URI, or any other infections in the last 2 weeks. Denies any suicidal or homicidal ideations. MALINDA reviewed with patient.     Endorses this is their full medical & surgical history including medications prescribed. Pt verbalized understanding of all pre-operative instructions and was given the opportunity to ask questions and have them answered. They were instructed to follow up with their surgeon/proceduralists office with any additional questions regarding their procedure.    Anesthesia Alert  NO--Difficult Airway  NO--History of neck surgery or radiation  NO--Limited ROM of neck  NO--History of Malignant hyperthermia  NO--No personal or family history of Pseudocholinesterase deficiency.  NO--Prior Anesthesia Complication  NO--Latex Allergy  NO--Loose teeth  NO--History of Rheumatoid Arthritis  NO--MALINDA  NO--Bleeding Risk  NO--Other_____    Revised Cardiac Risk Index for Pre-Operative Risk   RESULT SUMMARY:  2 points  RCRI Score    10.1 %  Risk of major cardiac event      INPUTS:  High-risk surgery —> 0 = No  History of ischemic heart disease —> 1 = Yes  History of congestive heart failure —> 0 = No  History of cerebrovascular disease —> 0 = No  Pre-operative treatment with insulin —> 1 = Yes  Pre-operative creatinine >2 mg/dL / 176.8 µmol/L —> 0 = No    [facility] Activity Status Index (DASI)  RESULT SUMMARY:  24.95 points  The higher the score (maximum 58.2), the higher the functional status.    5.81 METs    INPUTS:  Take care of self —> 2.75 = Yes  Walk indoors —> 1.75 = Yes  Walk 1&ndash;2 blocks on level ground —> 2.75 = Yes  Climb a flight of stairs or walk up a hill —> 5.5 = Yes  Run a short distance —> 0 = No  Do light work around the house —> 2.7 = Yes  Do moderate work around the house —> 3.5 = Yes  Do heavy work around the house —> 0 = No  Do yardwork —> 0 = No  Have sexual relations —> 0 = No  Participate in moderate recreational activities —> 6 = Yes  Participate in strenuous sports —> 0 = No

## 2025-01-27 NOTE — H&P PST ADULT - REASON FOR ADMISSION
68 yo male presents for PAST in preparation for arthroplasties of second, third, fourth, fifth toe (left) on 1/31/2025 under LSB by Dr. Rodríguez (OR [facility]).

## 2025-01-28 DIAGNOSIS — M20.42 OTHER HAMMER TOE(S) (ACQUIRED), LEFT FOOT: ICD-10-CM

## 2025-01-28 DIAGNOSIS — Z01.818 ENCOUNTER FOR OTHER PREPROCEDURAL EXAMINATION: ICD-10-CM

## 2025-01-28 PROBLEM — E11.9 TYPE 2 DIABETES MELLITUS WITHOUT COMPLICATIONS: Chronic | Status: INACTIVE | Noted: 2024-04-04 | Resolved: 2025-01-27

## 2025-01-30 ENCOUNTER — APPOINTMENT (OUTPATIENT)
Dept: CARDIOLOGY | Facility: CLINIC | Age: 70
End: 2025-01-30

## 2025-01-30 DIAGNOSIS — I71.21 ANEURYSM OF THE ASCENDING AORTA, WITHOUT RUPTURE: ICD-10-CM

## 2025-01-30 PROBLEM — E11.9 TYPE 2 DIABETES MELLITUS WITHOUT COMPLICATIONS: Chronic | Status: ACTIVE | Noted: 2025-01-27

## 2025-01-30 PROCEDURE — 93306 TTE W/DOPPLER COMPLETE: CPT

## 2025-01-31 ENCOUNTER — NON-APPOINTMENT (OUTPATIENT)
Age: 70
End: 2025-01-31

## 2025-02-21 ENCOUNTER — RESULT REVIEW (OUTPATIENT)
Age: 70
End: 2025-02-21

## 2025-02-21 ENCOUNTER — OUTPATIENT (OUTPATIENT)
Dept: OUTPATIENT SERVICES | Facility: HOSPITAL | Age: 70
LOS: 1 days | Discharge: HOME | End: 2025-02-21
Payer: MEDICARE

## 2025-02-21 VITALS
HEART RATE: 75 BPM | WEIGHT: 220.02 LBS | HEIGHT: 74 IN | SYSTOLIC BLOOD PRESSURE: 131 MMHG | TEMPERATURE: 99 F | RESPIRATION RATE: 18 BRPM | DIASTOLIC BLOOD PRESSURE: 85 MMHG | OXYGEN SATURATION: 99 %

## 2025-02-21 VITALS — DIASTOLIC BLOOD PRESSURE: 65 MMHG | HEART RATE: 78 BPM | SYSTOLIC BLOOD PRESSURE: 105 MMHG

## 2025-02-21 DIAGNOSIS — Z95.1 PRESENCE OF AORTOCORONARY BYPASS GRAFT: Chronic | ICD-10-CM

## 2025-02-21 DIAGNOSIS — Z90.49 ACQUIRED ABSENCE OF OTHER SPECIFIED PARTS OF DIGESTIVE TRACT: Chronic | ICD-10-CM

## 2025-02-21 DIAGNOSIS — M20.42 OTHER HAMMER TOE(S) (ACQUIRED), LEFT FOOT: ICD-10-CM

## 2025-02-21 LAB — GLUCOSE BLDC GLUCOMTR-MCNC: 107 MG/DL — HIGH (ref 70–99)

## 2025-02-21 PROCEDURE — 73630 X-RAY EXAM OF FOOT: CPT | Mod: LT

## 2025-02-21 PROCEDURE — 88304 TISSUE EXAM BY PATHOLOGIST: CPT | Mod: 26

## 2025-02-21 PROCEDURE — 82962 GLUCOSE BLOOD TEST: CPT

## 2025-02-21 PROCEDURE — 88311 DECALCIFY TISSUE: CPT

## 2025-02-21 PROCEDURE — 73630 X-RAY EXAM OF FOOT: CPT | Mod: 26,LT

## 2025-02-21 PROCEDURE — 88311 DECALCIFY TISSUE: CPT | Mod: 26

## 2025-02-21 PROCEDURE — 88304 TISSUE EXAM BY PATHOLOGIST: CPT

## 2025-02-21 RX ORDER — OXYCODONE HYDROCHLORIDE 30 MG/1
5 TABLET ORAL ONCE
Refills: 0 | Status: DISCONTINUED | OUTPATIENT
Start: 2025-02-21 | End: 2025-02-21

## 2025-02-21 RX ORDER — SODIUM CHLORIDE 9 G/ML
1000 INJECTION, SOLUTION INTRAVENOUS
Refills: 0 | Status: DISCONTINUED | OUTPATIENT
Start: 2025-02-21 | End: 2025-02-21

## 2025-02-21 RX ORDER — HYDROMORPHONE HYDROCHLORIDE 4 MG/ML
0.5 INJECTION, SOLUTION INTRAMUSCULAR; INTRAVENOUS; SUBCUTANEOUS
Refills: 0 | Status: DISCONTINUED | OUTPATIENT
Start: 2025-02-21 | End: 2025-02-21

## 2025-02-21 RX ORDER — ONDANSETRON 4 MG/1
4 TABLET, ORALLY DISINTEGRATING ORAL ONCE
Refills: 0 | Status: DISCONTINUED | OUTPATIENT
Start: 2025-02-21 | End: 2025-02-21

## 2025-02-21 RX ORDER — ACETAMINOPHEN 160 MG/5ML
1000 SUSPENSION ORAL ONCE
Refills: 0 | Status: DISCONTINUED | OUTPATIENT
Start: 2025-02-21 | End: 2025-02-21

## 2025-02-21 NOTE — ASU DISCHARGE PLAN (ADULT/PEDIATRIC) - NS MD DC FALL RISK RISK
For information on Fall & Injury Prevention, visit: [email or web address] OR  [email or web address] OR  https://www.cdc.gov/steadi/patient.html

## 2025-02-21 NOTE — BRIEF OPERATIVE NOTE - COMMENTS
Patient tolerated procedure well.   20cc 0.5% Marcaine: 2% Lidocaine Plain 50:50 mixture  900mg Clindamycin

## 2025-02-21 NOTE — ASU DISCHARGE PLAN (ADULT/PEDIATRIC) - FINANCIAL ASSISTANCE
[facility] provides services at a reduced cost to those who are determined to be eligible through [facility]’s financial assistance program. Information regarding [facility]’s financial assistance program can be found by going to [email or web address] or by calling [phone].

## 2025-02-21 NOTE — BRIEF OPERATIVE NOTE - NSICDXBRIEFPROCEDURE_GEN_ALL_CORE_FT
PROCEDURES:  Arthroplasty, foot 21-Feb-2025 11:54:10 Left foot 2nd digit arthroplasty Alix Baird  Tenotomy, flexor, for hammer toe 21-Feb-2025 11:54:56 Left foot third, fourth and fifth flexor tenotomies Alix Baird

## 2025-02-21 NOTE — ASU DISCHARGE PLAN (ADULT/PEDIATRIC) - CARE PROVIDER_API CALL
Barak Rodríguez  Podiatric Medicine and Surgery  [address] [place] [address]  Phone: [phone]  Fax: [phone]  Follow Up Time:

## 2025-02-26 LAB — SURGICAL PATHOLOGY STUDY: SIGNIFICANT CHANGE UP

## 2025-02-28 DIAGNOSIS — I10 ESSENTIAL (PRIMARY) HYPERTENSION: ICD-10-CM

## 2025-02-28 DIAGNOSIS — Z88.7 ALLERGY STATUS TO SERUM AND VACCINE: ICD-10-CM

## 2025-02-28 DIAGNOSIS — Z79.4 LONG TERM (CURRENT) USE OF INSULIN: ICD-10-CM

## 2025-02-28 DIAGNOSIS — M20.42 OTHER HAMMER TOE(S) (ACQUIRED), LEFT FOOT: ICD-10-CM

## 2025-02-28 DIAGNOSIS — Z87.891 PERSONAL HISTORY OF NICOTINE DEPENDENCE: ICD-10-CM

## 2025-02-28 DIAGNOSIS — E11.9 TYPE 2 DIABETES MELLITUS WITHOUT COMPLICATIONS: ICD-10-CM

## 2025-02-28 DIAGNOSIS — Z88.0 ALLERGY STATUS TO PENICILLIN: ICD-10-CM

## 2025-02-28 DIAGNOSIS — I25.10 ATHEROSCLEROTIC HEART DISEASE OF NATIVE CORONARY ARTERY WITHOUT ANGINA PECTORIS: ICD-10-CM

## 2025-02-28 DIAGNOSIS — Z95.1 PRESENCE OF AORTOCORONARY BYPASS GRAFT: ICD-10-CM

## 2025-05-29 ENCOUNTER — APPOINTMENT (OUTPATIENT)
Dept: CARDIOLOGY | Facility: CLINIC | Age: 70
End: 2025-05-29

## 2025-06-18 ENCOUNTER — APPOINTMENT (OUTPATIENT)
Dept: CARDIOLOGY | Facility: CLINIC | Age: 70
End: 2025-06-18
Payer: MEDICARE

## 2025-06-18 PROCEDURE — 93880 EXTRACRANIAL BILAT STUDY: CPT

## 2025-06-25 ENCOUNTER — NON-APPOINTMENT (OUTPATIENT)
Age: 70
End: 2025-06-25

## 2025-06-30 ENCOUNTER — APPOINTMENT (OUTPATIENT)
Dept: CARDIOLOGY | Facility: CLINIC | Age: 70
End: 2025-06-30
Payer: MEDICARE

## 2025-06-30 VITALS — RESPIRATION RATE: 18 BRPM | HEART RATE: 71 BPM | SYSTOLIC BLOOD PRESSURE: 118 MMHG | DIASTOLIC BLOOD PRESSURE: 70 MMHG

## 2025-06-30 VITALS — HEIGHT: 74 IN | WEIGHT: 228 LBS | BODY MASS INDEX: 29.26 KG/M2

## 2025-06-30 PROCEDURE — 93000 ELECTROCARDIOGRAM COMPLETE: CPT

## 2025-06-30 PROCEDURE — G2211 COMPLEX E/M VISIT ADD ON: CPT

## 2025-06-30 PROCEDURE — 99214 OFFICE O/P EST MOD 30 MIN: CPT

## 2025-07-21 ENCOUNTER — OUTPATIENT (OUTPATIENT)
Dept: OUTPATIENT SERVICES | Facility: HOSPITAL | Age: 70
LOS: 1 days | End: 2025-07-21
Payer: MEDICARE

## 2025-07-21 ENCOUNTER — RESULT REVIEW (OUTPATIENT)
Age: 70
End: 2025-07-21

## 2025-07-21 DIAGNOSIS — I71.21 ANEURYSM OF THE ASCENDING AORTA, WITHOUT RUPTURE: ICD-10-CM

## 2025-07-21 DIAGNOSIS — Z95.1 PRESENCE OF AORTOCORONARY BYPASS GRAFT: Chronic | ICD-10-CM

## 2025-07-21 DIAGNOSIS — Z90.49 ACQUIRED ABSENCE OF OTHER SPECIFIED PARTS OF DIGESTIVE TRACT: Chronic | ICD-10-CM

## 2025-07-21 PROCEDURE — 71250 CT THORAX DX C-: CPT

## 2025-07-21 PROCEDURE — 71250 CT THORAX DX C-: CPT | Mod: 26

## 2025-07-22 DIAGNOSIS — I71.21 ANEURYSM OF THE ASCENDING AORTA, WITHOUT RUPTURE: ICD-10-CM

## 2025-08-01 ENCOUNTER — APPOINTMENT (OUTPATIENT)
Dept: GASTROENTEROLOGY | Facility: CLINIC | Age: 70
End: 2025-08-01
Payer: MEDICARE

## 2025-08-01 DIAGNOSIS — Z12.11 ENCOUNTER FOR SCREENING FOR MALIGNANT NEOPLASM OF COLON: ICD-10-CM

## 2025-08-01 DIAGNOSIS — K63.5 POLYP OF COLON: ICD-10-CM

## 2025-08-01 DIAGNOSIS — K74.60 UNSPECIFIED CIRRHOSIS OF LIVER: ICD-10-CM

## 2025-08-01 DIAGNOSIS — K86.9 DISEASE OF PANCREAS, UNSPECIFIED: ICD-10-CM

## 2025-08-01 PROCEDURE — 99215 OFFICE O/P EST HI 40 MIN: CPT

## 2025-08-01 RX ORDER — SODIUM SULFATE, POTASSIUM SULFATE AND MAGNESIUM SULFATE 1.6; 3.13; 17.5 G/177ML; G/177ML; G/177ML
17.5-3.13-1.6 SOLUTION ORAL
Qty: 1 | Refills: 0 | Status: ACTIVE | COMMUNITY
Start: 2025-08-01 | End: 1900-01-01

## 2025-08-05 ENCOUNTER — APPOINTMENT (OUTPATIENT)
Dept: ENDOCRINOLOGY | Facility: CLINIC | Age: 70
End: 2025-08-05

## 2025-08-18 ENCOUNTER — APPOINTMENT (OUTPATIENT)
Dept: CARDIOTHORACIC SURGERY | Facility: CLINIC | Age: 70
End: 2025-08-18

## 2025-08-18 DIAGNOSIS — I71.21 ANEURYSM OF THE ASCENDING AORTA, WITHOUT RUPTURE: ICD-10-CM

## 2025-08-18 PROCEDURE — 99202 OFFICE O/P NEW SF 15 MIN: CPT | Mod: 93

## 2025-08-28 ENCOUNTER — EMERGENCY (EMERGENCY)
Facility: HOSPITAL | Age: 70
LOS: 0 days | Discharge: HOME | End: 2025-08-28
Attending: EMERGENCY MEDICINE
Payer: MEDICARE

## 2025-08-28 VITALS
TEMPERATURE: 97 F | RESPIRATION RATE: 17 BRPM | WEIGHT: 250 LBS | HEART RATE: 66 BPM | DIASTOLIC BLOOD PRESSURE: 89 MMHG | OXYGEN SATURATION: 98 % | SYSTOLIC BLOOD PRESSURE: 121 MMHG
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
## 2025-08-28 DIAGNOSIS — F10.10 ALCOHOL ABUSE, UNCOMPLICATED: ICD-10-CM

## 2025-08-28 DIAGNOSIS — Z88.0 ALLERGY STATUS TO PENICILLIN: ICD-10-CM
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
## 2025-08-28 DIAGNOSIS — Z95.1 PRESENCE OF AORTOCORONARY BYPASS GRAFT: Chronic | ICD-10-CM
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
## 2025-08-28 DIAGNOSIS — S50.812A ABRASION OF LEFT FOREARM, INITIAL ENCOUNTER: ICD-10-CM

## 2025-08-28 DIAGNOSIS — Z88.7 ALLERGY STATUS TO SERUM AND VACCINE: ICD-10-CM

## 2025-08-28 DIAGNOSIS — Z90.49 ACQUIRED ABSENCE OF OTHER SPECIFIED PARTS OF DIGESTIVE TRACT: Chronic | ICD-10-CM

## 2025-08-28 PROCEDURE — 99284 EMERGENCY DEPT VISIT MOD MDM: CPT | Mod: FS

## 2025-08-28 PROCEDURE — 99282 EMERGENCY DEPT VISIT SF MDM: CPT

## 2025-08-28 RX ORDER — NALTREXONE HYDROCHLORIDE 50 MG/1
1 TABLET, FILM COATED ORAL
Qty: 30 | Refills: 0
Start: 2025-08-28 | End: 2025-09-26

## 2025-09-02 ENCOUNTER — EMERGENCY (EMERGENCY)
Facility: HOSPITAL | Age: 70
LOS: 0 days | Discharge: HOME | End: 2025-09-02
Attending: EMERGENCY MEDICINE
Payer: MEDICARE

## 2025-09-02 VITALS
SYSTOLIC BLOOD PRESSURE: 117 MMHG | RESPIRATION RATE: 16 BRPM | OXYGEN SATURATION: 98 % | HEART RATE: 73 BPM | TEMPERATURE: 98 F | DIASTOLIC BLOOD PRESSURE: 7 MMHG | WEIGHT: 229.94 LBS

## 2025-09-02 DIAGNOSIS — R60.0 LOCALIZED EDEMA: ICD-10-CM

## 2025-09-02 DIAGNOSIS — Z88.0 ALLERGY STATUS TO PENICILLIN: ICD-10-CM

## 2025-09-02 DIAGNOSIS — Z95.1 PRESENCE OF AORTOCORONARY BYPASS GRAFT: Chronic | ICD-10-CM

## 2025-09-02 DIAGNOSIS — S40.022A CONTUSION OF LEFT UPPER ARM, INITIAL ENCOUNTER: ICD-10-CM
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
## 2025-09-02 DIAGNOSIS — Z90.49 ACQUIRED ABSENCE OF OTHER SPECIFIED PARTS OF DIGESTIVE TRACT: Chronic | ICD-10-CM

## 2025-09-02 DIAGNOSIS — S42.202A UNSPECIFIED FRACTURE OF UPPER END OF LEFT HUMERUS, INITIAL ENCOUNTER FOR CLOSED FRACTURE: ICD-10-CM
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
## 2025-09-02 DIAGNOSIS — Z88.7 ALLERGY STATUS TO SERUM AND VACCINE: ICD-10-CM

## 2025-09-02 DIAGNOSIS — M79.89 OTHER SPECIFIED SOFT TISSUE DISORDERS: ICD-10-CM

## 2025-09-02 LAB
ALBUMIN SERPL ELPH-MCNC: 3.5 G/DL — SIGNIFICANT CHANGE UP (ref 3.5–5.2)
ALP SERPL-CCNC: 90 U/L — SIGNIFICANT CHANGE UP (ref 30–115)
ALT FLD-CCNC: 26 U/L — SIGNIFICANT CHANGE UP (ref 0–41)
ANION GAP SERPL CALC-SCNC: 14 MMOL/L — SIGNIFICANT CHANGE UP (ref 7–14)
APTT BLD: 34.9 SEC — SIGNIFICANT CHANGE UP (ref 27–39.2)
AST SERPL-CCNC: 62 U/L — HIGH (ref 0–41)
BASOPHILS # BLD AUTO: 0.03 K/UL — SIGNIFICANT CHANGE UP (ref 0–0.2)
BASOPHILS NFR BLD AUTO: 0.2 % — SIGNIFICANT CHANGE UP (ref 0–2)
BILIRUB SERPL-MCNC: 3.5 MG/DL — HIGH (ref 0.2–1.2)
BUN SERPL-MCNC: 24 MG/DL — HIGH (ref 10–20)
CALCIUM SERPL-MCNC: 9.3 MG/DL — SIGNIFICANT CHANGE UP (ref 8.4–10.5)
CHLORIDE SERPL-SCNC: 95 MMOL/L — LOW (ref 98–110)
CO2 SERPL-SCNC: 24 MMOL/L — SIGNIFICANT CHANGE UP (ref 17–32)
CREAT SERPL-MCNC: 0.6 MG/DL — LOW (ref 0.7–1.5)
EGFR: 104 ML/MIN/1.73M2 — SIGNIFICANT CHANGE UP
EGFR: 104 ML/MIN/1.73M2 — SIGNIFICANT CHANGE UP
EOSINOPHIL # BLD AUTO: 0.02 K/UL — SIGNIFICANT CHANGE UP (ref 0–0.5)
EOSINOPHIL NFR BLD AUTO: 0.2 % — SIGNIFICANT CHANGE UP (ref 0–6)
GLUCOSE SERPL-MCNC: 113 MG/DL — HIGH (ref 70–99)
HCT VFR BLD CALC: 31.9 % — LOW (ref 39–50)
HGB BLD-MCNC: 11.2 G/DL — LOW (ref 13–17)
IMM GRANULOCYTES # BLD AUTO: 0.1 K/UL — HIGH (ref 0–0.07)
IMM GRANULOCYTES NFR BLD AUTO: 0.8 % — SIGNIFICANT CHANGE UP (ref 0–0.9)
INR BLD: 1.36 RATIO — HIGH (ref 0.65–1.3)
LYMPHOCYTES # BLD AUTO: 1.39 K/UL — SIGNIFICANT CHANGE UP (ref 1–3.3)
LYMPHOCYTES NFR BLD AUTO: 11.1 % — LOW (ref 13–44)
MCHC RBC-ENTMCNC: 34.6 PG — HIGH (ref 27–34)
MCHC RBC-ENTMCNC: 35.1 G/DL — SIGNIFICANT CHANGE UP (ref 32–36)
MCV RBC AUTO: 98.5 FL — SIGNIFICANT CHANGE UP (ref 80–100)
MONOCYTES # BLD AUTO: 1.47 K/UL — HIGH (ref 0–0.9)
MONOCYTES NFR BLD AUTO: 11.7 % — SIGNIFICANT CHANGE UP (ref 2–14)
NEUTROPHILS # BLD AUTO: 9.54 K/UL — HIGH (ref 1.8–7.4)
NEUTROPHILS NFR BLD AUTO: 76 % — SIGNIFICANT CHANGE UP (ref 43–77)
NRBC # BLD AUTO: 0 K/UL — SIGNIFICANT CHANGE UP (ref 0–0)
NRBC # FLD: 0 K/UL — SIGNIFICANT CHANGE UP (ref 0–0)
NRBC BLD AUTO-RTO: 0 /100 WBCS — SIGNIFICANT CHANGE UP (ref 0–0)
PLATELET # BLD AUTO: 161 K/UL — SIGNIFICANT CHANGE UP (ref 150–400)
PMV BLD: 11.3 FL — SIGNIFICANT CHANGE UP (ref 7–13)
POTASSIUM SERPL-MCNC: 4 MMOL/L — SIGNIFICANT CHANGE UP (ref 3.5–5)
POTASSIUM SERPL-SCNC: 4 MMOL/L — SIGNIFICANT CHANGE UP (ref 3.5–5)
PROT SERPL-MCNC: 6.8 G/DL — SIGNIFICANT CHANGE UP (ref 6–8)
PROTHROM AB SERPL-ACNC: 16.1 SEC — HIGH (ref 9.95–12.87)
RBC # BLD: 3.24 M/UL — LOW (ref 4.2–5.8)
RBC # FLD: 14.6 % — HIGH (ref 10.3–14.5)
SODIUM SERPL-SCNC: 133 MMOL/L — LOW (ref 135–146)
WBC # BLD: 12.55 K/UL — HIGH (ref 3.8–10.5)
WBC # FLD AUTO: 12.55 K/UL — HIGH (ref 3.8–10.5)

## 2025-09-02 PROCEDURE — 93971 EXTREMITY STUDY: CPT | Mod: 26,LT

## 2025-09-02 PROCEDURE — 73030 X-RAY EXAM OF SHOULDER: CPT | Mod: LT

## 2025-09-02 PROCEDURE — 73060 X-RAY EXAM OF HUMERUS: CPT | Mod: LT

## 2025-09-02 PROCEDURE — 93931 UPPER EXTREMITY STUDY: CPT | Mod: LT

## 2025-09-02 PROCEDURE — 93931 UPPER EXTREMITY STUDY: CPT | Mod: 26,LT

## 2025-09-02 PROCEDURE — 85730 THROMBOPLASTIN TIME PARTIAL: CPT

## 2025-09-02 PROCEDURE — 73030 X-RAY EXAM OF SHOULDER: CPT | Mod: 26,LT

## 2025-09-02 PROCEDURE — 36415 COLL VENOUS BLD VENIPUNCTURE: CPT

## 2025-09-02 PROCEDURE — 93971 EXTREMITY STUDY: CPT | Mod: LT

## 2025-09-02 PROCEDURE — 99285 EMERGENCY DEPT VISIT HI MDM: CPT | Mod: GC,57

## 2025-09-02 PROCEDURE — 80053 COMPREHEN METABOLIC PANEL: CPT

## 2025-09-02 PROCEDURE — 73060 X-RAY EXAM OF HUMERUS: CPT | Mod: 26,LT

## 2025-09-02 PROCEDURE — 23600 CLTX PROX HUMRL FX W/O MNPJ: CPT | Mod: LT

## 2025-09-02 PROCEDURE — 85025 COMPLETE CBC W/AUTO DIFF WBC: CPT

## 2025-09-02 PROCEDURE — 23600 CLTX PROX HUMRL FX W/O MNPJ: CPT | Mod: 54,LT

## 2025-09-02 PROCEDURE — 85610 PROTHROMBIN TIME: CPT

## 2025-09-02 PROCEDURE — 99284 EMERGENCY DEPT VISIT MOD MDM: CPT | Mod: 25

## 2025-09-05 ENCOUNTER — APPOINTMENT (OUTPATIENT)
Dept: ORTHOPEDIC SURGERY | Facility: CLINIC | Age: 70
End: 2025-09-05

## 2025-09-05 DIAGNOSIS — S42.292A OTHER DISPLACED FRACTURE OF UPPER END OF LEFT HUMERUS, INITIAL ENCOUNTER FOR CLOSED FRACTURE: ICD-10-CM

## 2025-09-18 ENCOUNTER — APPOINTMENT (OUTPATIENT)
Dept: ORTHOPEDIC SURGERY | Facility: CLINIC | Age: 70
End: 2025-09-18

## 2025-09-18 DIAGNOSIS — S42.292A OTHER DISPLACED FRACTURE OF UPPER END OF LEFT HUMERUS, INITIAL ENCOUNTER FOR CLOSED FRACTURE: ICD-10-CM
